# Patient Record
Sex: FEMALE | Race: WHITE
[De-identification: names, ages, dates, MRNs, and addresses within clinical notes are randomized per-mention and may not be internally consistent; named-entity substitution may affect disease eponyms.]

---

## 2017-09-24 ENCOUNTER — HEALTH MAINTENANCE LETTER (OUTPATIENT)
Age: 22
End: 2017-09-24

## 2017-11-06 ENCOUNTER — HOSPITAL ENCOUNTER (OUTPATIENT)
Dept: BEHAVIORAL HEALTH | Facility: CLINIC | Age: 22
Discharge: HOME | End: 2017-11-06
Attending: SOCIAL WORKER | Admitting: SOCIAL WORKER
Payer: COMMERCIAL

## 2017-11-06 PROCEDURE — H0001 ALCOHOL AND/OR DRUG ASSESS: HCPCS

## 2017-11-06 RX ORDER — NORGESTIMATE AND ETHINYL ESTRADIOL 7DAYSX3 28
1 KIT ORAL
COMMUNITY
Start: 2012-01-11

## 2017-11-06 RX ORDER — ALBUTEROL SULFATE 90 UG/1
2 AEROSOL, METERED RESPIRATORY (INHALATION)
COMMUNITY
Start: 2016-12-10

## 2017-11-06 NOTE — PROGRESS NOTES
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    What particular treatment choices and options would you like to have? IOP    Do you have a preference for a particular treatment program? [facility]    Criteria for Diagnosis     Criteria for Diagnosis  DSM-5 Criteria for Substance Use Disorder  Instructions: Determine whether the client currently meets the criteria for Substance Use Disorder using the diagnostic criteria in the DSM-V pp.481-589. Current means during the most recent 12 months outside a facility that controls access to substances    Category of Substance Severity (ICD-10 Code / DSM 5 Code)     Alcohol Use Disorder Mild  (F10.10) (305.00)   Cannabis Use Disorder NA   Hallucinogen Use Disorder NA   Inhalant Use Disorder NA   Opioid Use Disorder NA   Sedative, Hypnotic, or Anxiolytic Use Disorder NA   Stimulant Related Disorder NA   Tobacco Use Disorder NA   Other (or unknown) Substance Use Disorder NA       Collateral Contact Summary   Number of contacts made: 2    Contact with referring person:  No.    If court related records were reviewed, summarize here: NA    Information from  collateral contacts supported/largely agreed with information from the client and associated risk ratings.      Rule 25 Assessment Summary and Plan   's Recommendation    1. Abstain from using all non-prescribed mood altering chemicals and substances.  2. Attend an IOP treatment program such as [facility], [facility], or [facility].  3. Comply with all legal obligations and requirements of [facility].      Collateral Contacts     Name:    [facility] ([facility])   Relationship:    Medical Records   Phone Number:    BERNARD Releases:    Yes     See throughout this assessment (above) collateral obtained from EMR.      Collateral Contacts     Name:    Earl Ruiz   Relationship:    Father   Phone Number:    [phone]   Releases:    Yes     Left VM for collateral on 11/7/17. Collateral called back on 11/7/417 and confirmed client report. Collateral contact confirmed that client has had her second DWI, that her pattern of use and last use date do sound accurate to him. Collateral contact confirmed client does not have medical conditions, or MH diagnosis. Collateral contact confirmed that him and his wife have expressed concern about the second DWI. Collateral contact confirmed that client does work at [employer] and the hours vary between full and part time, that the client lives with a friend, has a significant other, does not have children, and that most of her social connections do use but it is not an important part of their friendships. Collateral contact confirmed that client has only had two DWI's as legal issues and the first was in March 2016 and both were in [facility].     ollateral Contacts      A problematic pattern of alcohol/drug use leading to clinically significant impairment or distress, as manifested by at least two of the following, occurring within a 12-month period:    Recurrent alcohol/drug use in situations in which it is  physically hazardous.  Tolerance, as defined by either of the following: A need for markedly increased amounts of alcohol/drug to achieve intoxication or desired effect. and A markedly diminished effect with continued use of the same amount of alcohol/drug.  Withdrawal, as manifested by either of the following: The characteristic withdrawal syndrome for alcohol/drug (refer to Criteria A and B of the criteria set for alcohol/drug withdrawal).      Specify if: In early remission:  After full criteria for alcohol/drug use disorder were previously met, none of the criteria for alcohol/drug use disorder have been met for at least 3 months but for less than 12 months (with the exception that Criterion A4,  Craving or a strong desire or urge to use alcohol/drug  may be met).     In sustained remission:   After full criteria for alcohol use disorder were previously met, non of the criteria for alcohol/drug use disorder have been met at any time during a period of 12 months or longer (with the exception that Criterion A4,  Craving or strong desire or urge to use alcohol/drug  may be met).   Specify if:   This additional specifier is used if the individual is in an environment where access to alcohol is restricted.    Mild: Presence of 2-3 symptoms    Moderate: Presence of 4-5 symptoms    Severe: Presence of 6 or more symptoms

## 2017-11-06 NOTE — PROGRESS NOTES
"[facility]   [address] [place] [address] N.  [address] [place] [id number]        ADULT CD ASSESSMENT ADDENDUM      Patient Name: Nasra Ruiz  Cell Phone:   Home: [phone] (home)    Mobile:   Telephone Information:   Mobile [phone]       Email:  [email or web address]  Emergency Contact: Earl Ruiz   Tel: [phone]    ________________________________________________________________________      The patient is  Single, in a serious relationship    With which race do you identify? White    Family History   Mother   Living Father   Living   No Step-mother   NA No Step-father   NA   Maternal Grandmother   Living Fraternal  Grandmother Living   Maternal Grandfather    Living Fraternal   Grandfather    No Sister(s)   NA 1 Brother(s)   Living   No Half-sister(s)   NA No Half-brother(s)   NA             Who raised you? (parents, grandparents, adoptive parents, step-parents, etc.)    Both Parents    Have any of your family members or significant others had problems with mental illness or substance abuse?  Please explain.    \"my dad had an issue with alcoholism\"    Do you have any children or Stepchildren? No    Are you being investigated by Child Protection Services? No    Do you have a child protection worker, probation office or ? Yes, please explain: \"I am on probation in [place]\"    How would you describe your current finances?  Doing okay    If you are having problems, (unpaid bills, bankruptcy, IRS problems) please explain:  No    If working or a student are you able to function appropriately in that setting? Yes    Describe your preferred learning style:  by reading    What personal strengths do you have that can help you get sober?  \"I am very stubborn and persistent and can do what I set my mind to\".    Do you currently self-administer your medications?  N/A    Have you ever had to lie to people important to you about how much you patel?     No   Have you ever felt the " need to bet more and more money?     No   Have you ever attempted treatment for a gambling problem?     No   Have you ever touched or fondled someone else inappropriately or forced them to have sex with you against their will?     No   Are you or have you ever been a registered sex offender?     No   Is there any history of sexual abuse in your family?     No   Have you ever felt obsessed by your sexual behavior, such as having sex with many partners, masturbating often, using pornography often?     No   Have you ever received therapy or stayed in the hospital for mental health problems?     No   Have you ever hurt yourself, such as cutting, burning or hitting yourself?     No   Have you ever purged, binged or restricted yourself as a way to control your weight?     No   Are you on a special diet?     No   Do you have any concerns regarding your nutritional status?     No   Have you had any appetite changes in the last 3 months?     No   Have you had any weight loss or weight gain in the last 3 months?    If weight patient gained or lost was more than 10 lbs, then refer to program RN / attending Physician for assessment.     No   Was the patient informed of BMI?    Declined to provide weight.     No   Do you have any dental problems?     No   Have you ever lived through any trauma or stressful life events?     No   In the past month, have you had any of the following symptoms related to the trauma listed above? (dreams, intense memories, flashbacks, physical reactions, etc.)     No   Have you ever believed people were spying on you, or that someone was plotting against you or trying to hurt you?     No   Have you ever believed someone was reading your mind or could hear your thoughts or that you could actually read someone's mind or hear what another person was thinking?     No   Have you ever believed that someone of some force outside of yourself was putting thoughts into your mind or made you act in a way that  was not your usual self?  Have you ever though you were possessed?     No   Have you ever believed you were being sent special messages through the TV, radio or newspaper?     No   Have you ever heard things other people couldn't hear, such as voices or other noises?     No   Have you ever had visions when you were awake?  Or have you ever seen things other people couldn't see?     No       Suicide Screening Questions:   1. Are you feeling hopeless about the present/future?     No   2. Have you ever had thoughts about taking your life?     No   3. When did you have these thoughts?     NA   4. Do you have any current intent or active desire to take your life?     No   5. Do you have a plan to take your life?     No   6. Have you ever made a suicide attempt?     No   7. Do you have access to pills, guns or other methods to kill yourself?     No     Guide to Risk Ratings   IDEATION: Active thoughts of suicide? INTENT: Intent to follow on suicide? PLAN: Plan to follow through on suicide? Level of Risk:   IF Yes Yes Yes Patient = High Emergent   IF Yes Yes No Patient = High Urgent/Non-Emergent   IF Yes No No Patient = Moderate Non-Urgent   IF No No   No Patient = Low Risk   The patient's ADDITIONAL RISK FACTORS and lack of PROTECTIVE FACTORS may increase their overall suicide risk ratings.     Patient's Responses (within the last 30 days)   IDEATION: Active thoughts of suicide?    No     INTENT: Intent to follow on suicide?    No     PLAN: Plan to follow through on suicide?    No     Determining the level of risk depends on the patient responses, suicide risk factors and protective factors.     Additional Risk Factors: Significant legal problems including being at risk of incarceration   Protective Factors:  Having people in his/her life that would prevent the patient from considering committing suicide (i.e. young children, spouse, parents, etc.)  An absence of mental health issues or stable and well treated mental  "health issues  An absence of chronic health problems or stable and well treated chronic health issues  Having restricted access to highly lethal means of suicide     Risk Status   Emergent? No   Urgent / Non-Emergent? No   Present / Non- Urgent? No    Low Risk? Yes, Evaluation Counselors - Document in Epic / SBAR to counselor \"No identified risk\" and Treatment Counselors - Assess weekly in progress notes under Dimension 3 and summarize in Discharge / Treatment summary under Dimension 3.   Additional information to support suicide risk rating: See Above       Mental Health Status   Physical Appearance/Attire: Appears stated age and Neat   Hygiene: well groomed   Eye Contact: at examiner   Speech Rate:  regular   Speech Volume: regular   Speech Quality: fluid   Cognitive/Perceptual:  reality based   Cognition: memory intact    Judgment: able to concentrate   Insight: able to concentrate   Orientation:  time, place, person and situation   Thought::   concrete   Hallucinations:  none   General Behavioral Tone: cooperative   Psychomotor Activity: no problem noted   Gait:  no problem   Mood: normal   Affect: congruence/appropriate   Counselor Notes: NA       Criteria for Diagnosis: DSM-5 Criteria for Substance Use Disorders      Alcohol Use Disorder Mild - 305.00 (F10.10)       Level of Care   I.) Intoxication and Withdrawal: 0   II.) Biomedical:  0   III.) Emotional and Behavioral:  1   IV.) Readiness to Change:  1   V.) Relapse Potential: 2   VI.) Recovery Environmental: 2       Initial Problem List     The patient lacks relapse prevention skills  The patient has poor coping skills  The patient has poor refusal skills   The patient lacks a sober peer support network  The patient has current legal issues    Patient/Client is willing to follow treatment recommendations.  Yes    Counselor: SONJA Greer      Vulnerable Adult Checklist for OUTPATIENTS     1.  Do you have a physical, emotional or mental infirmity or " dysfunction?       No    2.  Does this issue impair your ability to provide for your own care without help, including providing yourself with food, shelter, clothing, healthcare or supervision?       No    3.  Because of this issue, I need assistance to protect myself from maltreatment by others.      No    Based on the above information:    This person is not a functional Vulnerable Adult according to [place] Statute 626.5572 subdivision 21.

## 2017-11-07 ASSESSMENT — ANXIETY QUESTIONNAIRES
GAD7 TOTAL SCORE: 1
5. BEING SO RESTLESS THAT IT IS HARD TO SIT STILL: NOT AT ALL
3. WORRYING TOO MUCH ABOUT DIFFERENT THINGS: NOT AT ALL
1. FEELING NERVOUS, ANXIOUS, OR ON EDGE: SEVERAL DAYS
IF YOU CHECKED OFF ANY PROBLEMS ON THIS QUESTIONNAIRE, HOW DIFFICULT HAVE THESE PROBLEMS MADE IT FOR YOU TO DO YOUR WORK, TAKE CARE OF THINGS AT HOME, OR GET ALONG WITH OTHER PEOPLE: NOT DIFFICULT AT ALL
6. BECOMING EASILY ANNOYED OR IRRITABLE: NOT AT ALL
7. FEELING AFRAID AS IF SOMETHING AWFUL MIGHT HAPPEN: NOT AT ALL
2. NOT BEING ABLE TO STOP OR CONTROL WORRYING: NOT AT ALL

## 2017-11-07 ASSESSMENT — PATIENT HEALTH QUESTIONNAIRE - PHQ9
SUM OF ALL RESPONSES TO PHQ QUESTIONS 1-9: 0
5. POOR APPETITE OR OVEREATING: NOT AT ALL

## 2017-11-08 ASSESSMENT — ANXIETY QUESTIONNAIRES: GAD7 TOTAL SCORE: 1

## 2017-11-08 NOTE — PROGRESS NOTES
"CHEMICAL DEPENDENCY ASSESSMENT DICTATION     EVALUATION COUNSELOR:  SONJA Greer.   PATIENT'S ADDRESS:  [address] [place] [address], [address], [place] [address].   TELEPHONE:  [phone].   STATISTICS:  YOB: 1995.  Age:  22.  Gender:  Female.  Marital Status:  Single.   DATE OF EVALUATION:  11/06/2017.   INSURANCE:  [employer].   REFERRAL SOURCE:  .      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  Got an assessment at [facility] and [facility] end of 08/2017 and then dad recommended coming to [place], had  for my 1st DWI as well.\"      HEALTH HISTORY AND MEDICATIONS:  Client denies any medical conditions or concerns.  Client reported birth control as medication which is Ortho Tri Cyclen.      HISTORY OF PREVIOUS TREATMENT AND COUNSELING:  Client denies a past treatment.  Client denied any counseling, current or past.      HISTORY OF ALCOHOL AND DRUG USE:  Alcohol:  Age of first use 16.  Age 22, weekends sometimes after work, not every weekend, 3-5 drinks, and consuming alcohol.  Date of last use:  09/21/2017.      SUMMARY OF CHEMICAL DEPENDENCY SYMPTOMS ACKNOWLEDGED BY THE PATIENT:  The patient is meeting criteria for alcohol use disorder, mild, which are:  Recurrent alcohol/drug use in situations in which it is physically hazardous; tolerance as defined by a need for markedly increased amounts of alcohol/drug to achieve intoxication or desired effect; a markedly diminished effect with the continued use of the same amount of alcohol/drug; withdrawal as manifested by the characteristic withdrawal syndrome for alcohol/drug.      SUMMARY OF COLLATERAL DATA:  The [place] electronic medical record, EMR.  See throughout CD assessment.  Collateral obtained from " [facility] and also her father.  Left voicemail for collateral on 11/07/2017. Collateral called back on 11/7/417 and confirmed client report. Collateral contact confirmed that client has had her second DWI, that her pattern of use and last use date do sound accurate to him. Collateral contact confirmed client does not have medical conditions, or MH diagnosis. Collateral contact confirmed that him and his wife have expressed concern about the second DWI. Collateral contact confirmed that client does work at [email or web address] and the hours vary between full and part time, that the client lives with a friend, has a significant other, does not have children, and that most of her social connections do use but it is not an important part of their friendships. Collateral contact confirmed that client has only had two DWI's as legal issues and the first was in March 2016 and both were in [facility].      MENTAL STATUS ASSESSMENT:  Physical appearance and attire:  Appears stated age and neat.  Hygiene:  Well groomed.  Eye contact:  At examiner.  Speech regular.  Speech volume regular.  Speech quality fluid.  Cognitive perceptual:  Reality based.  Cognition:  Memory intact.  Judgment:  Able to concentrate.  Insight:  Able to concentrate.  Orientation to time, place, person and situation.  Thought:  Cary.  Hallucinations:  None.  General behavioral tone:  Cooperative.  Psychomotor activity:  No problem noted.  Gait:  No problem.  Mood normal.  Affect:  Congruent and appropriate.      VULNERABLE ADULT ASSESSMENT:  This person is not a functional vulnerable adult according to [place] statute.      DSM IMPRESSION/DIAGNOSIS:  F10.10.      [facility] PLACEMENT CRITERIA:   DIMENSION 1:  Intoxication/Withdrawal:  Risk rating 0.  The client reported a 2nd DWI as the reason for the assessment.  Client reported last use date of alcohol was 09/21/2017.  Client reported withdrawal symptoms in the past 12 months, but denied any in the last 30  days.  Client denied past admission to detox.      DIMENSION 2:  Biomedical Conditions and Complications:  Risk rating 0.  The client denied any current medical conditions or concerns.  Client reported having a primary care clinic.  Client reported she takes her medications as prescribed directly.  Client reported 1 past accident/injury related to use.      DIMENSION 3:  Emotional/Behavioral/Cognitive:  Risk rating 1.  Client denies past or current mental health diagnosis or symptoms.  Client denied current or past prescribed medications for mental health symptoms.  Client denied past or current therapy.  Client denied past or current abuse of any type.  Client reported supportive childhood.  Client reported her dad has alcoholism.  Client denied past or current SI.  Client denied past suicide attempts.  Client's risk factor of SI are legal issues.  Client protective factors of SI are having people worth living for, stable physical/mental health, and no means.      DIMENSION 4:  Readiness to Change:  Risk rating 1.  Client reported the problem was she did not stop drinking and driving.  Client reported her parents are now concerned with her use since she obtained a 2nd DWI.  Client reported that their concern is valid.  Client reported she is willing to not drink anymore since it creates more problems.  Client appears to be in the contemplative stage of change as evidenced by her verbal report.      DIMENSION 5:  Relapse, Continued Use, & Continued Problem Potential:  Risk rating 2.  Client reported what helps her abstain from use is an application on her phone that tracks her days and sends inspirational quotes to her.  Client reported nothing specific as back to use.  Client appears to lack insight into her use and education on addiction.  Client denied cravings, but reported triggers of going out with people that drink or are drinking.  Client denied past treatment attendance or sober support group meeting  attendance.  Client appears to lack positive coping skills/relapse prevention skills.      DIMENSION 6:  Recovery Environment:  Risk rating 2.  Client reported her hours of work varies from part-time to full-time.  Client reported using is not important to her social connections, but social connections do use.  Client reported she is in a romantic relationship and her partner does not really use.  Client denied having children.  Client reported she lives with a friend who uses occasionally.  Client reported her family, boyfriend and close friends are supportive of her.  Client reported current legal issues and is on probation.      RECOMMENDATIONS:  Client is recommended to:   1.  Abstain from using all non-prescribed mood-altering chemicals and substances.   2.  Attend an [facility] treatment program such as [facility], [facility] or [facility].   3.  Comply with all legal obligations and requirements of [facility].      INITIAL PROBLEM LIST:  The patient lacks relapse prevention skills.  The patient has poor coping skills.  The patient has poor refusal skills.  The patient lacks a sober peer support network.  The patient has current legal issues.      RATIONALE:  Client reported her use has negatively impacted multiple areas of her life and meets criteria for a substance use disorder occurring within a 12-month period.  Client would appear to benefit from a structured sober support and routine, learning more positive coping skills/relapse prevention skills, and also education on addiction to gain more awareness and insight into her substance use and the negative consequences of her use.         This information has been disclosed to you from records protected by Federal confidentiality rules (42 CFR part 2). The Federal rules prohibit you from making any further disclosure of this information unless further disclosure is expressly permitted by the written consent of the  person to whom it pertains or as otherwise permitted by 42 CFR part 2. A general authorization for the release of medical or other information is NOT sufficient for this purpose. The Federal rules restrict any use of the information to criminally investigate or prosecute any alcohol or drug abuse patient.      BIRGIT HOUSTON [facility]             D: 2017 13:56   T: 2017 04:12   MT: lg      Name:     JOHN PLUNKETT   MRN:      [id number]        Account:      [id number]   :      1995           Visit Date:   2017      Document: [id number]

## 2017-11-22 ENCOUNTER — HOSPITAL ENCOUNTER (OUTPATIENT)
Dept: BEHAVIORAL HEALTH | Facility: CLINIC | Age: 22
End: 2017-11-22
Attending: SOCIAL WORKER
Payer: COMMERCIAL

## 2017-11-22 PROBLEM — F19.20 CHEMICAL DEPENDENCY (H): Status: ACTIVE | Noted: 2017-11-22

## 2017-11-22 PROCEDURE — H2035 A/D TX PROGRAM, PER HOUR: HCPCS | Mod: HQ

## 2017-11-23 NOTE — PROGRESS NOTES
D- Client attended orientation this date. Client was given an orientation packet which was reviewed with them in its entirety. The following was specifically reviewed with the client: Program philosophy, treatment goals, program schedules, education components, the family program, using treatment materials, program rules, client rights/responsibilities, information on HIV, STDS, Hepatitis, TB, information on use while pregnant, opioid information and Narcan information, program abuse prevention plan/reporting protocol, client grievance procedure, risks of treatment, confidentiality, treatment agreement, facility tour/safety information and information on co-occurring disorders. Client was also given information on insurance and was given the business office contact information should they have any questions. Client reported no previous treatment.  She a goal of surrounding better ways to deal with stress.    I- Signing of paperwork, tour of facility, provided client with counselor's name and phone number. Introduced to group processes by sharing and feedback, educated on stage of change.    A- Client appears motivated.    P- Client will review orientation paperwork and materials.  Client will review her handbook and primary counselor, Marely Wilkerson, will contact client to arrange start date for primary group.

## 2017-11-23 NOTE — PROGRESS NOTES
Initial Services Plan        Before your first treatment group, please do the following    Immediate health & safety concerns: Look for a support network (such as AA, NA, DBT group, a Bahai group, etc.)    Suggestions for client during the time between intake & completion of treatment plan:  Review your patient or client handbook.    Client issues to be addressed in the first treatment sessions:  Identify motivations(s) for coming to treatment, i.e. legal, family, job, self      SONJA Lynch  11/22/2017  6:26 PM

## 2017-12-06 ENCOUNTER — HOSPITAL ENCOUNTER (OUTPATIENT)
Dept: BEHAVIORAL HEALTH | Facility: CLINIC | Age: 22
End: 2017-12-06
Attending: SOCIAL WORKER
Payer: COMMERCIAL

## 2017-12-06 PROCEDURE — H2035 A/D TX PROGRAM, PER HOUR: HCPCS | Mod: HQ

## 2017-12-07 NOTE — PROGRESS NOTES
CD ADULT Progress Note     Treatment Plan Review completed on:  To be completed on 12/12/17    Attendance Dates: 12/6/17    Total # of Group Sessions:  Orientation on 11/22/17 + 1     MONDAY TUESDAY WEDNESDAY THURSDAY FRIDAY SATURDAY SUNDAY Total   Group Therapy   2 hrs     2 hrs   Specialty Groups*           1:1           Family Program           [place]             Phase II             Absent           Total   2 hrs      2 hrs     *Specialty Groups include Mental Health Care, Assertiveness and Communication, Sobriety Maintenance Skills, Spiritual Care, Stress Management, Relapse Prevention, Family Systems.                    Learning Style:  Visual    Staff member contributing:  Marely Wilkerson, MS, LADC, LPCC    Received supervision:  No    Client:  contributed to goals and plan    Did Client receive a copy of treatment plan/revised plan:  No (Client is scheduled to review her treatment plan and sign the acknowledgement note on 12/12/17.)    Changes to Treatment Plan:  No    Client agrees with plan/revised plan:  TBD    Any changes in Vulnerable Adult Status:  No    Substance Use Disorders:  None and Alcohol Use Disorder Mild (F10.10)      ASA Risk Ratings and Data       DIMENSION 1: Acute Intoxication/Withdrawal  The client's ability to cope with withdrawal symptoms and current state of intoxication       Acute Intoxication/Withdrawal - Current Risk Factor:  0    Reporting sober date of 9/21/17    Goals:  TBD    Data:  Client did not demonstrate or report any signs/symptoms of intoxication or withdrawal.        DIMENSION 2:  Biomedical Conditions and Complaints  The degree to which any physical disorder would interfere with treatment for substance abuse and the client's ability to tolerate any related discomfort     Biomedical Conditions and Complaints - Current Risk Factor:  0    Goals: TBD    Data:  Client denied any current physical health concerns.  Client does report having a PCP.          DIMENSION 3:   Emotional/Behavioral/Cognitive Conditions and Complications  The degree to which any condition or complications are likely to interfere with treatment for substance abuse or with function in significant life areas and the likelihood of risk of harm to self or others.     Emotional/Behavioral - Current Risk Factor:  1    DSM-5 Diagnoses:   Therapist will do a diagnostic interview to update information Client reports no past or current mental health concerns.      Suicide Assessment:  Risk Status    Ideation - Active thoughts of suicide Intent to follow through on suicide Plan for completing suicide    Yes No Yes No Yes No   Emergent  X  X  X   Urgent / Non-Emergent  X  X  X   Non- Urgent  X  X  X   No Current/Active Risk   X  X  X     Goals: TBD     Data:  Client denied any past or current SI and SIB.  She did report experiencing some sleeplessness on her weekly check-in sheet and that she is going to try an earlier bedtime.  She also noted that she is going to the gym and engaging in [place] shopping as sober activities.      Client has the following protective factors: supportive family and boyfriend, employed, has her license in [employer]. Client has the following risk factors: legal issues, and reported that she is socially influenced when it comes to using alcohol, and has used it in the past to enjoy herself.       DIMENSION 4:  Readiness to Change  Consider the amount of support and encouragement necessary to keep the client involved in treatment.     Readiness to Change - Current Risk Factor:  0    Goals:  TBD    Data:  Client rated herself at a 9 (scale of 1-10, with 10 being the highest) for motivation for sobriety.  She willingly participated in small group therapy on 12/6/17 and shared that she is in treatment due to her DWIs.         DIMENSION 5:  Relapse/Continued Use/Continued Problem Potential  Consider the degree to which the client recognizes relapse issues and has the skills to prevent relapse  "of either substance use or mental health problems.     Relapse/Continued Use/Continued Problem Potential - Current Risk Factor:  2    Goals:  TBD    Data:  Client noted on her check-in sheet that \"keeping busy\" helps minimize her cravings.  She also noted that she was triggered last week when an old friend reached out to her and that her dad is part of her sober support network.        DIMENSION 6:  Recovery Environment  Consider the degree to which key areas of the client's life are supportive of or antagonistic to treatment participation and recovery.     Recovery Environment - Current Risk Factor:  2    Support group attended this week:  No    Did family agree to attend family week:  No    If yes:  none schedule this week    Goals:  TBD    Data:  Client reported no sober support group attendance this past week.  She also denied having a sponsor.  She did note that she engaged in sober activities such as going to the gym and [place] shopping.  She also noted that she wants to \"get a new puzzle\".  She noted that her last court date is approaching and she noted \"I'm getting anxious for that\".  She noted that her roommate is supportive of her and does not keep alcohol in their apartment.         D: Deep breathing exercise     Intervention:  Client was asked when it would be beneficial to engage in deep breathing.      Assessment:  Stages of Change Model  Contemplation   Client attended group and was an active participant.  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        Plan:  Client might consider engaging in some deep breathing or body relaxation exercises before going to bed, as she has had some difficulty sleeping lately.    "

## 2017-12-11 ENCOUNTER — HOSPITAL ENCOUNTER (OUTPATIENT)
Dept: BEHAVIORAL HEALTH | Facility: CLINIC | Age: 22
End: 2017-12-11
Attending: SOCIAL WORKER
Payer: COMMERCIAL

## 2017-12-11 ENCOUNTER — BEH TREATMENT PLAN (OUTPATIENT)
Dept: BEHAVIORAL HEALTH | Facility: CLINIC | Age: 22
End: 2017-12-11

## 2017-12-11 PROCEDURE — H2035 A/D TX PROGRAM, PER HOUR: HCPCS | Mod: HQ

## 2017-12-11 NOTE — PROGRESS NOTES
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   [facility]  Adult Chemical Dependency Program  Treatment Plan Requirements    These services are provided by the facility for each patient/client according to the individual's treatment plan:    Individual and group counseling    Education    Transition services    Services to address any co-occurring mental illness    Service coordination    Initial Treatment Plan Goals:  1. Complete all the requirements of Program Orientation.  2. Maintain medication compliance throughout the program.  3. Complete requirements for workshop/skills groups based on identified issues on your problem list.  4. Complete the support group attendance feedback sheet weekly.  5. Gain family involvement in treatment process to address family issues from the problem list.  6. Attend and participate in all required groups per individual treatment plan.  7. Focus attention to individualized issues from the treatment plan.  8. Complete all requirements for UA's, alcohol screening tests and other testing.  9. Schedule a physical examination if recommended.    In addition to the above, complete all individual goals as specifically outlines on your treatment plan.    Criteria for discharge:  Patients/clients are discharged from the program following completion of the entire program including Phase I and II or acceptance of other post-treatment referrals such as [facility], or aftercare at other facilities.  Patients/clients may also be discharged for inappropriate behavior or chemical use.      Favorable Discharge - Patients/clients have completed agreed upon treatment goals, understand their diagnosis and appear motivated about the follow-up care.    Guarded Discharge - Patients/clients have demonstrated some  understanding of their diagnosis and recovery process, and have completed some of their treatment goals.  This prognosis also includes patients/clients who have completed some treatment goals but have not made commitment to community support or follow through with referrals.    Unfavorable Discharge - Patients/clients have not completed agreed upon treatment goals due to their own choice, have limited understanding of their diagnosis, and have shown minimal or inconsistent behavior conducive to recovery.  Those patients/clients discharged due to behavioral problems will also be unfavorable discharges.                                  Adult CD Treatment Plan     Nasra Ruiz [id number]   1995 22 year old female      Substance Use Disorders: Alcohol   ------------------------------------------------------------------------------------------------------------------  Acute Intoxication/Withdrawal Potential     DIMENSION 1  RISK FACTOR: 0             AssignmentDate Source Area of Treatment Focus/Goal/  Treatment Strategies Target  Date Initials Outcome Completion  Date   12/11/17 Self -  Current  Area of Treatment Focus: Client's last use date was 9/21/17.     Goal: Client will develop effective strategies to maintain abstinence.     Treatment Strategies:  1. Client will report to staff and group any alcohol or drug use.               5/1/18                          Biomedical Conditions and Complaints     DIMENSION 2  RISK FACTOR: 0             AssignmentDate Source Area of Treatment Focus/Goal/  Treatment Strategies Target  Date Initials Outcome Completion  Date   12/11/17 Self -  Deferred  Area of Treatment Focus: Client denied any current physical health concerns.     Goal: Client will continue to effectively manage her overall physical health.    Treatment Strategies:   1. Client will continue to follow-up with her PCP, as needed.               5/1/18                   "      -----------------------------------------------------------------------------------------------------------------    Emotional/Behavioral/Cognitive Conditions and Complications     DIMENSION 3  RISK FACTOR: 1             AssignmentDate Source Area of Treatment Focus/Goal/  Treatment Strategies Target  Date Initials Outcome Completion  Date   12/11/17 Self -  Current  Area of Treatment Focus: Client denied any current or past mental health concerns/issues. Client reported that she wants to change her focus regarding her career and wants to better herself.    Goal: Client will gain insight regarding her interest in various career fields and gain awareness regarding her identity to focus on what in particular she wants to improve in herself.     Treatment Strategies:  1. Client will complete the autobiography assignment and share her experience with the group.     2. Client will create an identity map and determine various roles she currently hold in her life.     3. Client will identify 5 strengths she holds and explain how she can use these strengths to better herself and help her achieve personal goals.     4. Client will complete the \"self-exploration: identity, values, experiences, and goals\" worksheet.     5. Client will complete a \"career clusters\" worksheet and share her experience with the group.                           12/20/17        1/3/18        1/3/18          1/8/18        1/24/18                           APA/SS        APA/SS        APA/SS          APA/SS        APA/SS                           Effective         Effective        Effective          Effective        Effective                           1/17/18        1/3/18        3/13/18          4/3/18        1/24/18     -------------------------------------------------------------------------------------------------------------------     Readiness to Change     DIMENSION 4  RISK FACTOR: 0             AssignmentDate Source Area of Treatment " Focus/Goal/  Treatment Strategies Target  Date Initials Outcome Completion  Date   12/11/17 Self -  Current  Area of Treatment Focus: Client has struggled to maintain long-term sobriety and is need of an outpatient treatment program.    Goal: Client will verbalize the benefits of this outpatient treatment program.    Treatment Strategies:  1. Client will attend three, two hour sessions of group a week and individual sessions as needed throughout the course of Phase I treatment.    2. Client will attend a 1.5 hour session of group a week, and individual sessions as needed throughout the course of Phase II treatment.     Area of Treatment Focus: Client verbalized changes she wants to make regarding her finances and to improve her overall lifestyle.     Goal: Client will gain insight regarding her motivation towards the changes she wants to achieve, and develop realistic goals to achieve the financial changes she wants to make in her life.     Treatment Strategies:  1. Client will complete a motivation and change assignment.     2. Client will develop a SMART goal regarding her finances and money she wants to save, and share her goal and progress in group therapy. Client will be given a financial SMART goal to use as a guide.                  2/5/18 5/1/18 12/18/17 12/27/17                 APA/SS          SS                              APA/SS      APA/SS                 Effective                                        Effective      Client reported on 2/6/18 (1:1 session) that she did not need to complete this anymore as she has already been saving money on her own.                  2/7/18 12/18/17 2/6/18     -------------------------------------------------------------------------------------------------------------------     Relapse/Continues Use/Continues Problem Potential     DIMENSION 5  RISK FACTOR: 2                "  AssignmentDate Source Area of Treatment Focus/Goal/  Treatment Strategies  Target  Date Initials Outcome Completion  Date   12/11/17 Self -  Current  Area of Treatment Focus: Client lacks insight to relapse prevention skills and reports that she drinks in social situations and is socially influenced by others when it comes to using alcohol.     Goal: Client will gain insight regarding relapse prevention skills and will demonstrate these skills and how she can apply them in social situations.     Treatment Strategies:  1.Client will complete the \"pros and cons of quitting\" and determine the positive and negative immediate and long-term consequences of using alcohol to gain awareness regarding her use.     2. Client will complete the \"social pressures\" worksheet to determine social pressures that lead to the use of alcohol. Client will determine 5 social pressure situations, their level of threat and a coping skill she can use when faced with this situation.     3. Client will complete the \"relapse warning signs\" worksheet and identify 5 relapse warning signs and coping skills.     4. Client will complete the recovery care plan while in Phase II.                         1/10/18            1/15/18                1/15/18        4/17/18                                     APA/SS            APA/SS                APA/SS        SS                                     Effective                Effective                                     1/22/18 1/22/18     Recovery Environment     DIMENSION 6  RISK FACTOR: 2                 AssignmentDate Source Area of Treatment Focus/Goal/  Treatment Strategies Target  Date Initials Outcome Completion  Date   12/11/17 Self -  Current  Area of Treatment Focus: Client reported that she is currently on probation due to a DWI she received in August 2017 and lacks a sober support network.    Goal: Client will follow all directives from her , once she acquires a " "specific  and will expand her sober support friend network.     Treatment Strategies:  1. Client will verbalize her specific probation requirements in group therapy. If needed, request feedback from peers.     2. Client will attend a sober support meeting and share experience with the group.                  3. Client will attain numbers of at least one individual from a sober support meeting she attends or identify one sober friend she can contact.     4. Client will contact this individual and schedule a social gathering, such as; coffee.                        5/1/18 1/17/18 1/17/18 1/22/18                             SS        APA/SS                    APA/SS          APA/SS                                Client reported that she does not want to attend a sober support meeting    Effective          Effective                                                   2/7/18 2/20/18     All interventions that are designated as \"current\" will need to be completed in order to transition out of treatment with a favorable prognosis. The treatment plan is a flexible document and a work in progress. Interventions and goals may be added at any time to customize the treatment plan to each individual's needs. Client may work with therapist/counselor to change interventions as long as they pertain to the goals stipulated in the plan and/or are clinically driven.     Individual abuse prevention plan (required for lodging plus) : specific actions, referral:   No additional protection measures required other than the Program Abuse Prevention Plan - NO      "

## 2017-12-11 NOTE — PROGRESS NOTES
Acknowledgement of Current Treatment Plan       I have reviewed my treatment plan with my therapist / counselor on 12/12/17, and I have received a copy of my treatment plan on this date. I agree with the plan as it is written in the electronic health record.    Client is completing her safety plan. Client reports her last date of use on 9/21/17.    Name Signature   Nasra Ruiz    Name of Therapist / Counselor    Ruperto Reeves, Intern    Marely Wilkerson, MS, LADC, LPCC,  Supervising Therapist

## 2017-12-12 ENCOUNTER — HOSPITAL ENCOUNTER (OUTPATIENT)
Dept: BEHAVIORAL HEALTH | Facility: CLINIC | Age: 22
End: 2017-12-12
Attending: SOCIAL WORKER
Payer: COMMERCIAL

## 2017-12-12 PROCEDURE — H2035 A/D TX PROGRAM, PER HOUR: HCPCS

## 2017-12-12 PROCEDURE — H2035 A/D TX PROGRAM, PER HOUR: HCPCS | Mod: HQ

## 2017-12-12 NOTE — PROGRESS NOTES
CD ADULT Progress Note     Treatment Plan Review completed on: 12/12/17; Client turned her safety plan in on 12/13/17 (later than expected) as she was unsure how to answer some of the questions.  Therefore, client's Acknowledgement of Current Treatment Plan was signed by client and counseling intern on 12/13/17.      Attendance Dates: 12/11/17, 12/12/17, 12/13/17    Total # of Group Sessions:  Orientation on 11/22/17 + 4     MONDAY TUESDAY WEDNESDAY THURSDAY FRIDAY SATURDAY SUNDAY Total   Group Therapy 2hrs 2hrs 2hrs     6hrs   Specialty Groups*           1:1           Family Program           [place]             Phase II             Absent           Total 2hrs 2hrs 2hrs     6hrs     *Specialty Groups include Mental Health Care, Assertiveness and Communication, Sobriety Maintenance Skills, Spiritual Care, Stress Management, Relapse Prevention, Family Systems.                    Learning Style:  Visual    Staff member contributing:  Marely Wilkerson, MS, LADC, LPCC    Received supervision:  No    Client:  contributed to goals and plan    Did Client receive a copy of treatment plan/revised plan:  Yes    Changes to Treatment Plan:  No    Client agrees with plan/revised plan:  Yes    Any changes in Vulnerable Adult Status:  No    Substance Use Disorders:  None and Alcohol Use Disorder Mild (F10.10)      ASAM Risk Ratings and Data       DIMENSION 1: Acute Intoxication/Withdrawal  The client's ability to cope with withdrawal symptoms and current state of intoxication       Acute Intoxication/Withdrawal - Current Risk Factor:  0    Reporting sober date of 9/21/17    Goals:  Client will develop effective strategies to maintain abstinence.    Data:  Client did not demonstrate or report any signs/symptoms of intoxication or withdrawal.        DIMENSION 2:  Biomedical Conditions and Complaints  The degree to which any physical disorder would interfere with treatment for substance abuse and the client's ability to tolerate any  "related discomfort     Biomedical Conditions and Complaints - Current Risk Factor:  0    Goals: Client will continue to effectively manage her overall physical health    Data:  Client denied any current physical health concerns.  Client does report having a PCP.          DIMENSION 3:  Emotional/Behavioral/Cognitive Conditions and Complications  The degree to which any condition or complications are likely to interfere with treatment for substance abuse or with function in significant life areas and the likelihood of risk of harm to self or others.     Emotional/Behavioral - Current Risk Factor:  1    DSM-5 Diagnoses:   Therapist will do a diagnostic interview to update information which is scheduled for 12/27/17. Client reports no past or current mental health concerns.      Suicide Assessment:  Risk Status    Ideation - Active thoughts of suicide Intent to follow through on suicide Plan for completing suicide    Yes No Yes No Yes No   Emergent  X  X  X   Urgent / Non-Emergent  X  X  X   Non- Urgent  X  X  X   No Current/Active Risk   X  X  X     Goals: Client will gain insight regarding her interest in various career fields and gain awareness regarding her identity to focus on what in particular she wants to improve in herself.     Data:  Client denied any past or current SI and SIB.      Client reported that her boyfriend came to visit her this past week, however, client mentioned that she is \"sad\" he is leaving again.     Client stated that she had to close at work this past weekend, and has been feeling tired due to waking up early in the morning to come to group, however, she mentioned that she is proud of herself for doing this and being in treatment.     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 Client has the following risk factors: legal issues, and reported that she is socially influenced when it comes to using alcohol, and has " "used it in the past to enjoy herself.       DIMENSION 4:  Readiness to Change  Consider the amount of support and encouragement necessary to keep the client involved in treatment.     Readiness to Change - Current Risk Factor:  0    Goals:  Client will verbalize the benefits of this outpatient treatment program.               Client will gain insight regarding her motivation towards the changes she wants to achieve, and develop               realistic goals to achieve the financial changes she wants to make in her life.     Data: Client reported on her check-in sheet from 12/11/17, that her main motivation for sobriety is her happiness. Client also mentioned that she has been able to start running more at the gym, and wants to continue to improve her running.     Client reported that this weekend, she will be working, however, she will be going out to lunch with her parents for self-care. Client also mentioned she will be aleksandar shopping this weekend.        DIMENSION 5:  Relapse/Continued Use/Continued Problem Potential  Consider the degree to which the client recognizes relapse issues and has the skills to prevent relapse of either substance use or mental health problems.     Relapse/Continued Use/Continued Problem Potential - Current Risk Factor:  2    Goals:  Client will gain insight regarding relapse prevention skills and will demonstrate these skills and how she can apply them in social situation.    Data:  Client reported on her check-in sheet from 12/11/17 that her biggest trigger this past week was \"going out to eat and not ordering a drink.\" Client also reported that avoiding situations where there is alcohol, help her minimize her cravings. During the unexpected situation activity in group on 12/12/17, client was able to describe what her reaction would be in a particular situation and what she would do in that scenario, which was to order a non-alcoholic drink in substitute of ordering a drink that " "contains alcohol.     Client also mentioned that during group therapy, she has been able to gain awareness regarding how alcohol can impact her life.      DIMENSION 6:  Recovery Environment  Consider the degree to which key areas of the client's life are supportive of or antagonistic to treatment participation and recovery.     Recovery Environment - Current Risk Factor:  2    Support group attended this week:  No    Did family agree to attend family week:  No    If yes:  none schedule this week    Goals:  Client will follow all directives from her , once she acquires a specific  and will expand her sober support network.     Data:  Client reported no sober support group attendance this past week. Client reported on her check-in sheet from 12/11/17, that attending an AA meeting is not \"necessary\" for her. Client reported that her father is supportive of her and her treatment. Client also mentioned that she has an interlock device in her car and considers this to be her \"antabuse.\"        D: Group therapy discussion    Intervention:  Gain awareness from group therapy discussions, and learn new skills she can use to avoid relapse from activities in group therapy.     Assessment:  Stages of Change Model  Contemplation   Client verbalized that she finds the mindfulness skills conducted in group therapy, such as deep breathing, are helpful for her. Client shared in group that she currently has an interlock device in her car, and verbalized that this has been helpful for her. Client also mentioned that the interlock device is her \"antabuse\" and what keeps her from drinking. It appears that client is currently relying on the interlock device to avoid using alcohol and driving. It appears that client continues to lack awareness on how her use of alcohol and driving and how this may affect herself and others.     Plan:  Client reported she will continue to use deep breathing in her daily " schedule. Client will continue to gain insight and awareness regarding her use of alcohol and identity in group therapy. Client has a motivation and change assignment due on 12/18/17 to gain more awareness regarding her motivation to change and avoid using alcohol.

## 2017-12-13 ENCOUNTER — HOSPITAL ENCOUNTER (OUTPATIENT)
Dept: BEHAVIORAL HEALTH | Facility: CLINIC | Age: 22
End: 2017-12-13
Attending: SOCIAL WORKER
Payer: COMMERCIAL

## 2017-12-13 PROCEDURE — H2035 A/D TX PROGRAM, PER HOUR: HCPCS | Mod: HQ

## 2017-12-13 NOTE — PROGRESS NOTES
Patient Safety Plan Template    Name:   Nasra Ruiz YOB: 1995 Age:  22 year old MR Number:  [id number]   Step 1: Warning signs (Thoughts, images, mood, situation, behavior) that a crisis may be developin. Being around alcohol     2. Being asked to get drinks     3. Being asked why I never go out anymore     Step 2: Internal coping strategies - Things I can do to take my mind off of my problems without contacting another person (relaxation technique, physical activity):     1. Deep breaths     2. Listen to music     3. Go to the gym     Step 3: People and social settings that provide distraction:     1. Name: Sree   Phone: [phone]   2. Name: Richar   Phone: [phone]   3. Place: My room   4. Place: Gym     The one thing that is most important to me and worth living for is: My parents   Step 4: People whom I can ask for help:     1. Name: Scott Hampton   Phone: [phone]     2. Name: Cristina Mckinley   Phone: [phone]     3. Name: Tony Briceno   Phone: [phone]     Step 5: Professionals or agencies I can contact during a crisis:     1. Clinician Name: Nicole Griffith   Phone: BERNARD   Clinician Pager or Emergency Contact #: NA     2. Clinician Name: [facility]   Phone: BERNARD     Clinician Pager or Emergency Contact #: NA     3. Local Urgent Care Services: [facility]    Urgent Care Services Address:     Urgent Care Services Phone: [phone]     4. Suicide Prevention [employer] [employer] Phone: [phone] ([phone])     Step 6: Making the environment safe:     1. No alcohol in the home     2. Avoiding Bars     Safety Plan Template 2008 Rosalinda Andres and Evgeny Bryan is reprinted with the express permission of the authors.  No portion of the Safety Plan Template may be reproduced without the express, written permission.  You can contact the authors at bhs@[place].[facility] or ohney@mail.[facility].

## 2017-12-13 NOTE — PROGRESS NOTES
Acknowledgement of Current Treatment Plan       I have reviewed my treatment plan with my therapist / counselor on 12/12/17, and I have received a copy of my treatment plan on this date. I agree with the plan as it is written in the electronic health record.    Client turned in her safety plan on 12/13/17, and received a copy of her safety plan on this date. Client reports her last date of use on 9/21/17.    Name Signature   Nasra Ruiz    Name of Therapist / Counselor    Ruperto Reeves, Intern    Marely Wilkerson, MS, LADC, LPCC,  Supervising Therapist

## 2017-12-18 ENCOUNTER — HOSPITAL ENCOUNTER (OUTPATIENT)
Dept: BEHAVIORAL HEALTH | Facility: CLINIC | Age: 22
End: 2017-12-18
Attending: SOCIAL WORKER
Payer: COMMERCIAL

## 2017-12-18 PROCEDURE — H2035 A/D TX PROGRAM, PER HOUR: HCPCS | Mod: HQ

## 2017-12-19 ENCOUNTER — HOSPITAL ENCOUNTER (OUTPATIENT)
Dept: BEHAVIORAL HEALTH | Facility: CLINIC | Age: 22
End: 2017-12-19
Attending: SOCIAL WORKER
Payer: COMMERCIAL

## 2017-12-19 PROCEDURE — H2035 A/D TX PROGRAM, PER HOUR: HCPCS | Mod: HQ

## 2017-12-19 NOTE — PROGRESS NOTES
[address] [address]        12/19/2017      Nasra Ruiz  1995        To Whom It May Concern:    This is to verify that the above named client is participating in a program for chemical dependency at [facility] at the location above.    The individual was admitted on 11/22/17.    Client is currently enrolled in treatment with a tentative discharge date of 5/1/2018.    This individual is participating in the following program(s):  the Outpatient Program          LIZBETH Sykes

## 2017-12-27 ENCOUNTER — HOSPITAL ENCOUNTER (OUTPATIENT)
Dept: BEHAVIORAL HEALTH | Facility: CLINIC | Age: 22
End: 2017-12-27
Attending: SOCIAL WORKER
Payer: COMMERCIAL

## 2017-12-27 PROCEDURE — H2035 A/D TX PROGRAM, PER HOUR: HCPCS | Mod: HQ

## 2017-12-27 PROCEDURE — H2035 A/D TX PROGRAM, PER HOUR: HCPCS
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
## 2017-12-28 NOTE — PROGRESS NOTES
CD ADULT Progress Note     Treatment Plan Review completed on: 12/12/17; Client turned her safety plan in on 12/13/17 (later than expected) as she was unsure how to answer some of the questions.  Therefore, client's Acknowledgement of Current Treatment Plan was signed by client and counseling intern on 12/13/17.      Attendance Dates: 12/27/17    Total # of Group Sessions:  Orientation on 11/22/17 + 7     MONDAY TUESDAY WEDNESDAY THURSDAY FRIDAY SATURDAY SUNDAY Total   Group Therapy   2hrs     2hrs   Specialty Groups*           1:1   1hr     1hr   Family Program           [place]             Phase II             Absent           Total   3hrs     3hrs     *Specialty Groups include Mental Health Care, Assertiveness and Communication, Sobriety Maintenance Skills, Spiritual Care, Stress Management, Relapse Prevention, Family Systems.                    Learning Style:  Visual    Staff member contributing:  Marely Wilkerson, MS, LADC, LPCC    Received supervision:  No    Client:  contributed to goals and plan    Did Client receive a copy of treatment plan/revised plan:  Yes    Changes to Treatment Plan:  No    Client agrees with plan/revised plan:  Yes    Any changes in Vulnerable Adult Status:  No    Substance Use Disorders:  None and Alcohol Use Disorder Mild (F10.10)      [facility] Risk Ratings and Data       DIMENSION 1: Acute Intoxication/Withdrawal  The client's ability to cope with withdrawal symptoms and current state of intoxication       Acute Intoxication/Withdrawal - Current Risk Factor:  0    Reporting sober date of 9/21/17    Goals:  Client will develop effective strategies to maintain abstinence.    Data:  Client did not demonstrate or report any signs/symptoms of intoxication or withdrawal.        DIMENSION 2:  Biomedical Conditions and Complaints  The degree to which any physical disorder would interfere with treatment for substance abuse and the client's ability to tolerate any related discomfort  "    Biomedical Conditions and Complaints - Current Risk Factor:  0    Goals: Client will continue to effectively manage her overall physical health    Data:  Client denied any current physical health concerns.  Client does report having a PCP.  Client rated her overall health at a 9, with 10 being the highest.            DIMENSION 3:  Emotional/Behavioral/Cognitive Conditions and Complications  The degree to which any condition or complications are likely to interfere with treatment for substance abuse or with function in significant life areas and the likelihood of risk of harm to self or others.     Emotional/Behavioral - Current Risk Factor:  1    DSM-5 Diagnoses:   Client completed a Diagnostic Assessment with this  on 12/27/17.  No mental health diagnoses were evident.  Client reports no past or current mental health concerns.      Suicide Assessment:  Risk Status    Ideation - Active thoughts of suicide Intent to follow through on suicide Plan for completing suicide    Yes No Yes No Yes No   Emergent  X  X  X   Urgent / Non-Emergent  X  X  X   Non- Urgent  X  X  X   No Current/Active Risk   X  X  X     Goals: Client will gain insight regarding her interest in various career fields and gain awareness regarding her identity to focus on what in particular she wants to improve in herself.     Data:  Client denied any past or current SI and SIB.      Client reported some stress due to starting house arrest the afternoon of 12/27/17 and feeling \"anxious and tired\".  She stated that her boyfriend and family have continued to provide her with much support.     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 Client has the following risk factors: legal issues, and reported that she is socially influenced when it comes to using alcohol, and has used it in the past to enjoy herself.       DIMENSION 4:  Readiness to Change  Consider the amount " "of support and encouragement necessary to keep the client involved in treatment.     Readiness to Change - Current Risk Factor:  0    Goals:  Client will verbalize the benefits of this outpatient treatment program.               Client will gain insight regarding her motivation towards the changes she wants to achieve, and develop               realistic goals to achieve the financial changes she wants to make in her life.     Data: Client reported on her check-in sheet from 12/27/17 that her main motivation for sobriety is her health; she rated her motivation level at an 8 (with 10 being the highest).  She noted that \"nothing\" currently blocks her motivation for sobriety.  Client has been verbalizing the benefits of outpatient treatment in group sessions.          DIMENSION 5:  Relapse/Continued Use/Continued Problem Potential  Consider the degree to which the client recognizes relapse issues and has the skills to prevent relapse of either substance use or mental health problems.     Relapse/Continued Use/Continued Problem Potential - Current Risk Factor:  2    Goals:  Client will gain insight regarding relapse prevention skills and will demonstrate these skills and how she can apply them in social situation.    Data:  Client rated her cravings over the last week at a level 1, with 10 being the highest.  She noted that staying busy helps decrease her cravings; she noted that people drinking at [place] was a big trigger for her.  Client reported in group that engaging in physical activities such as ice skating and skiing can help her manage stress.        DIMENSION 6:  Recovery Environment  Consider the degree to which key areas of the client's life are supportive of or antagonistic to treatment participation and recovery.     Recovery Environment - Current Risk Factor:  2    Support group attended this week:  No    Did family agree to attend family week:  No    If yes:  none schedule this week    Goals:  Client " "will follow all directives from her , once she acquires a specific  and will expand her sober support network.     Data:  Client reported no sober support group attendance this past week. Client reported on her check-in sheet from 12/18/17 that she does not need the extra support of an AA meeting right now.  Client reported that her father is supportive of her and her treatment and that he completed outpatient treatment at [place] around 3 years ago. Client also mentioned that she has an interlock device in her car and considers this to be her \"antabuse.\"  Client noted on her check-in sheet that she engaged in the following sober activities over the last week: movies, knitting, ice skating, and reading.          D: Response to peer's assignment \"Identity Map\"     Intervention:  Client was asked to share ways in which she related to her peer.      Assessment:  Stages of Change Model  Contemplation   Client appears to want to make sober friends; however, she finds it difficult to meet people.  It appears that client may lack outlets and ideas for how to meet friends that are sober and share similar interests.        Plan:  Attend group therapy on 1/2/18.  Begin house arrest on 12/27/17.  Continue engaging in sober fun activities.  Continue to develop ideas in group therapy on how to meet sober friends.    "

## 2017-12-28 NOTE — PROGRESS NOTES
Case consultation:  D)  Therapist presented ct's case for review.  DOC-alcohol.  Ct has DWI and has begun house arrest.  DA revealed no mh dx. P)  Continue tx plan.    Soraida Hawkins, SOPHY,LICSW, [facility]  Clinical

## 2018-01-02 ENCOUNTER — HOSPITAL ENCOUNTER (OUTPATIENT)
Dept: BEHAVIORAL HEALTH | Facility: CLINIC | Age: 23
End: 2018-01-02
Attending: SOCIAL WORKER
Payer: COMMERCIAL

## 2018-01-02 PROCEDURE — H2035 A/D TX PROGRAM, PER HOUR: HCPCS | Mod: HQ

## 2018-01-03 ENCOUNTER — HOSPITAL ENCOUNTER (OUTPATIENT)
Dept: BEHAVIORAL HEALTH | Facility: CLINIC | Age: 23
End: 2018-01-03
Attending: SOCIAL WORKER
Payer: COMMERCIAL

## 2018-01-03 PROCEDURE — H2035 A/D TX PROGRAM, PER HOUR: HCPCS | Mod: HQ

## 2018-01-04 NOTE — PROGRESS NOTES
CD ADULT Progress Note     Treatment Plan Review completed on: 1/4/2018    Client turned her safety plan in on 12/13/17 (later than expected) as she was unsure how to answer some of the questions.  Therefore, client's Acknowledgement of Current Treatment Plan was signed by client and counseling intern on 12/13/17.      Attendance Dates: 1/2/18, 1/3/18    Total # of Group Sessions:  Orientation on 11/22/17 + 8     MONDAY TUESDAY WEDNESDAY THURSDAY FRIDAY SATURDAY SUNDAY Total   Group Therapy  2hrs 2hrs     4hrs   Specialty Groups*           1:1           Family Program           [place]             Phase II             Absent           Total   4hrs     4hrs     *Specialty Groups include Mental Health Care, Assertiveness and Communication, Sobriety Maintenance Skills, Spiritual Care, Stress Management, Relapse Prevention, Family Systems.                    Learning Style:  Visual    Staff member contributing:  Marely Wilkerson, MS, LADC, LPCC; SONJA Swanson    Received supervision:  No    Client:  contributed to goals and plan    Did Client receive a copy of treatment plan/revised plan:  Yes    Changes to Treatment Plan:  No    Client agrees with plan/revised plan:  Yes    1) Care Coordination Activities:  no  2) Medical, Mental Health and other appointments the client attended: none  3) Medication issues: no change  4) Physical and mental health problems: no change  5) Review and evaluation of the individual abuse prevention plan: NA    Any changes in Vulnerable Adult Status:  No    Substance Use Disorders:  None and Alcohol Use Disorder Mild (F10.10)      ASAM Risk Ratings and Data       DIMENSION 1: Acute Intoxication/Withdrawal  The client's ability to cope with withdrawal symptoms and current state of intoxication       Acute Intoxication/Withdrawal - Current Risk Factor:  0    Reporting sober date of 9/21/17    Goals:  Client will develop effective strategies to maintain abstinence.    Data:  Client did not  "demonstrate or report any signs/symptoms of intoxication or withdrawal.  Client confirmed sobriety date.      DIMENSION 2:  Biomedical Conditions and Complaints  The degree to which any physical disorder would interfere with treatment for substance abuse and the client's ability to tolerate any related discomfort     Biomedical Conditions and Complaints - Current Risk Factor:  0    Goals: Client will continue to effectively manage her overall physical health    Data:  Client denied any current physical health concerns.  Client does report having a PCP.  Client rated her overall health at a 9, with 10 being the highest.  She endorses \"sleeping\" as a self-care activity.       DIMENSION 3:  Emotional/Behavioral/Cognitive Conditions and Complications  The degree to which any condition or complications are likely to interfere with treatment for substance abuse or with function in significant life areas and the likelihood of risk of harm to self or others.     Emotional/Behavioral - Current Risk Factor:  1    DSM-5 Diagnoses:   Client completed a Diagnostic Assessment with this  on 12/27/17.  No mental health diagnoses were evident.  Client reports no past or current mental health concerns.      Suicide Assessment:  Risk Status    Ideation - Active thoughts of suicide Intent to follow through on suicide Plan for completing suicide    Yes No Yes No Yes No   Emergent  X  X  X   Urgent / Non-Emergent  X  X  X   Non- Urgent  X  X  X   No Current/Active Risk   X  X  X     Goals: Client will gain insight regarding her interest in various career fields and gain awareness regarding her identity to focus on what in particular she wants to improve in herself.     Data:  Client denied any past or current SI and SIB.      Client reported some stress due to starting house arrest 12/27/17 and feeling \"bored and tired\".  She stated that her boyfriend and family have continued to provide her with much support.     Client has the " "following protective factors: supportive family and boyfriend, employed, [email or web address]. Client has the following risk factors: legal issues, and reported that she is socially influenced when it comes to using alcohol, and has used it in the past to enjoy herself.       DIMENSION 4:  Readiness to Change  Consider the amount of support and encouragement necessary to keep the client involved in treatment.     Readiness to Change - Current Risk Factor:  0    Goals:  Client will verbalize the benefits of this outpatient treatment program.               Client will gain insight regarding her motivation towards the changes she wants to achieve, and develop               realistic goals to achieve the financial changes she wants to make in her life.     Data: Client reported on her check-in sheet from 12/27/17 that her main motivation for sobriety is \"myself\"; she rated her motivation level at an 7 (with 10 being the highest).  She noted that \"nothing\" currently blocks her motivation for sobriety.         DIMENSION 5:  Relapse/Continued Use/Continued Problem Potential  Consider the degree to which the client recognizes relapse issues and has the skills to prevent relapse of either substance use or mental health problems.     Relapse/Continued Use/Continued Problem Potential - Current Risk Factor:  2    Goals:  Client will gain insight regarding relapse prevention skills and will demonstrate these skills and how she can apply them in social situation.    Data:  Client rated her cravings over the last week at a level 1, with 10 being the highest.  She noted that \"having a breathalyser I have to blow in 3 times a day\" helps decrease her cravings.  She noted that having lunch with her father was a trigger for her.  Client reported in group that engaging in  activities such as jigsaw puzzles can help her manage stress.        DIMENSION 6:  Recovery Environment  Consider the degree to which key areas of the " "client's life are supportive of or antagonistic to treatment participation and recovery.     Recovery Environment - Current Risk Factor:  2    Support group attended this week:  No    Did family agree to attend family week:  No    If yes:  none schedule this week    Goals:  Client will follow all directives from her , once she acquires a specific  and will expand her sober support network.     Data:  Client reported no sober support group attendance this past week. Client reported on her check-in sheet that she does not need the extra support of AA meetings right now.  Client reported that her father is supportive of her and her treatment and that he completed outpatient treatment at [facility] around 3 years ago. Client also mentioned that she has an interlock device in her car and is under house arrest.  Client noted on her check-in sheet that she engaged in the following sober activities over the last week: Netflix, puzzles and knitting.   She rates her living situation as 1 (best) due to \"no alcohol in home, I live with a supportive friend\".       D: Managing her low point over the past week using distress tolerance skills. Sober activities development and stress reduction skills.     Intervention:  Group question- How did you manage any low points or triggers over the holiday weekend?   What [employer] skill could you use in times of distress?   Brainstorming session to develop sober activities.  Presentation and practice of deep breathing, progressive muscle relaxation, body scan, and guided imagery.    Assessment:  Stages of Change Model  Contemplation   Client appears to want to make sober friends; however, she finds it difficult to meet people.  It appears that client may lack outlets and ideas for how to meet friends that are sober and share similar interests.        Plan:  Attend group therapy on 1/8-10/18.    Comply with requirements of house arrest.    Continue " engaging in sober fun activities.    Continue to develop ideas in group therapy on how to meet sober friends.

## 2018-01-08 ENCOUNTER — HOSPITAL ENCOUNTER (OUTPATIENT)
Dept: BEHAVIORAL HEALTH | Facility: CLINIC | Age: 23
End: 2018-01-08
Attending: SOCIAL WORKER
Payer: COMMERCIAL

## 2018-01-08 PROCEDURE — H2035 A/D TX PROGRAM, PER HOUR: HCPCS | Mod: HQ

## 2018-01-08 NOTE — PROGRESS NOTES
CD ADULT Progress Note     Treatment Plan Review completed on: 1/8/2018    Client turned her safety plan in on 12/13/17 (later than expected) as she was unsure how to answer some of the questions.  Therefore, client's Acknowledgement of Current Treatment Plan was signed by client and counseling intern on 12/13/17.      Attendance Dates: 1/8/18, 1/9/18, 1/10/18    Total # of Group Sessions:  Orientation on 11/22/17 + 12     MONDAY TUESDAY WEDNESDAY THURSDAY FRIDAY SATURDAY SUNDAY Total   Group Therapy 2hrs 2hrs 2hrs     6hrs   Specialty Groups*           1:1           Family Program           [place]             Phase II             Absent           Total 2hrs 2hrs 2hrs     6hrs     *Specialty Groups include Mental Health Care, Assertiveness and Communication, Sobriety Maintenance Skills, Spiritual Care, Stress Management, Relapse Prevention, Family Systems.                    Learning Style:  Visual    Staff member contributing:  Marely Wilkerson, MS, LADC, LPCC    Received supervision:  No    Client:  contributed to goals and plan    Did Client receive a copy of treatment plan/revised plan:  Yes    Changes to Treatment Plan:  No    Client agrees with plan/revised plan:  Yes    1) Care Coordination Activities:  no  2) Medical, Mental Health and other appointments the client attended: none  3) Medication issues: no change  4) Physical and mental health problems: no change  5) Review and evaluation of the individual abuse prevention plan: NA    Any changes in Vulnerable Adult Status:  No    Substance Use Disorders:  None and Alcohol Use Disorder Mild (F10.10)      ASAM Risk Ratings and Data       DIMENSION 1: Acute Intoxication/Withdrawal  The client's ability to cope with withdrawal symptoms and current state of intoxication       Acute Intoxication/Withdrawal - Current Risk Factor:  0    Reporting sober date of 9/21/17    Goals:  Client will develop effective strategies to maintain abstinence.    Data:  Client did  "not demonstrate or report any signs/symptoms of intoxication or withdrawal.  Client confirmed sobriety date.      DIMENSION 2:  Biomedical Conditions and Complaints  The degree to which any physical disorder would interfere with treatment for substance abuse and the client's ability to tolerate any related discomfort     Biomedical Conditions and Complaints - Current Risk Factor:  0    Goals: Client will continue to effectively manage her overall physical health    Data:  Client denied any current physical health concerns.  Client does report having a PCP.  Client rated her overall health at a 9, with 10 being the highest.  She endorses \"sleeping\" as a self-care activity.       DIMENSION 3:  Emotional/Behavioral/Cognitive Conditions and Complications  The degree to which any condition or complications are likely to interfere with treatment for substance abuse or with function in significant life areas and the likelihood of risk of harm to self or others.     Emotional/Behavioral - Current Risk Factor:  1    DSM-5 Diagnoses:   Client completed a Diagnostic Assessment with this  on 12/27/17.  No mental health diagnoses were evident.  Client reports no past or current mental health concerns.      Suicide Assessment:  Risk Status    Ideation - Active thoughts of suicide Intent to follow through on suicide Plan for completing suicide    Yes No Yes No Yes No   Emergent  X  X  X   Urgent / Non-Emergent  X  X  X   Non- Urgent  X  X  X   No Current/Active Risk   X  X  X     Goals: Client will gain insight regarding her interest in various career fields and gain awareness regarding her identity to focus on what in particular she wants to improve in herself.     Data:  Client denied any past or current SI and SIB.      Client reported less stress from house arrest and adjusting.  She reported feeling \"awake-happy\".  She stated that her boyfriend and family have continued to provide her with much support.     Client has " "the following protective factors: supportive family and boyfriend, employed, [place]. Client has the following risk factors: legal issues, and reported that she is socially influenced when it comes to using alcohol, and has used it in the past to enjoy herself.       DIMENSION 4:  Readiness to Change  Consider the amount of support and encouragement necessary to keep the client involved in treatment.     Readiness to Change - Current Risk Factor:  0    Goals:  Client will verbalize the benefits of this outpatient treatment program.               Client will gain insight regarding her motivation towards the changes she wants to achieve, and develop               realistic goals to achieve the financial changes she wants to make in her life.     Data: Client reported on her check-in sheet from 1/8/18 that her main motivation for sobriety is \"my energy level\"; she rated her motivation level at a 10 (with 10 being the highest).  She noted that \"nothing\" currently blocks her motivation for sobriety.         DIMENSION 5:  Relapse/Continued Use/Continued Problem Potential  Consider the degree to which the client recognizes relapse issues and has the skills to prevent relapse of either substance use or mental health problems.     Relapse/Continued Use/Continued Problem Potential - Current Risk Factor:  2    Goals:  Client will gain insight regarding relapse prevention skills and will demonstrate these skills and how she can apply them in social situation.    Data:  Client rated her cravings over the last week at a level 2, with 10 being the highest.  She noted that \"staying away from people who I used to drink with\" helps decrease her cravings.  She noted that she did not experience any triggers this past week.  Client reported watching lots of movies this past week as she is on house arrest.        DIMENSION 6:  Recovery Environment  Consider the degree to which key areas of the client's life are " "supportive of or antagonistic to treatment participation and recovery.     Recovery Environment - Current Risk Factor:  2    Support group attended this week:  No    Did family agree to attend family week:  No    If yes:  none schedule this week    Goals:  Client will follow all directives from her , once she acquires a specific  and will expand her sober support network.     Data:  Client reported no sober support group attendance this past week. Client reported on her check-in sheet that she does not need the extra support of AA meetings right now.  Client reported that her father is supportive of her and her treatment and that he completed outpatient treatment at [facility] around 3 years ago. Client also mentioned that she has an interlock device in her car and is under house arrest.  Client noted on her check-in sheet that she engaged in the following sober activities over the last week: lots of movies.   She rates her living situation as 1 (best) due to \"no alcohol is ever present in my apartment\".       D: DBT Skill PLEASE MASTER- specifically Build Mastery     Intervention:  Group question- How can you Build Mastery today?      Assessment:  Stages of Change Model  Contemplation   Client stated that she can Build Mastery by \"breaking down her house arrest\".  She stated that it is helpful for her to remember that she has completed 2 weeks of house arrest, which is 1/3 of her house arrest timeframe.  Client appears to be able to use logic about her current situation, which helps make it more bearable.        Plan:  Attend group therapy on 1/15/18.    Comply with requirements of house arrest.    Continue engaging in sober fun activities.    Continue to develop ideas in group therapy on how to meet sober friends.    "

## 2018-01-09 ENCOUNTER — HOSPITAL ENCOUNTER (OUTPATIENT)
Dept: BEHAVIORAL HEALTH | Facility: CLINIC | Age: 23
End: 2018-01-09
Attending: SOCIAL WORKER
Payer: COMMERCIAL

## 2018-01-09 PROCEDURE — H2035 A/D TX PROGRAM, PER HOUR: HCPCS | Mod: HQ

## 2018-01-10 ENCOUNTER — HOSPITAL ENCOUNTER (OUTPATIENT)
Dept: BEHAVIORAL HEALTH | Facility: CLINIC | Age: 23
End: 2018-01-10
Attending: SOCIAL WORKER
Payer: COMMERCIAL

## 2018-01-10 PROCEDURE — H2035 A/D TX PROGRAM, PER HOUR: HCPCS | Mod: HQ

## 2018-01-10 NOTE — ADDENDUM NOTE
Encounter addended by: Marely Wilkerson [facility] on: 1/10/2018  2:10 PM<BR>     Actions taken: Sign clinical note

## 2018-01-15 ENCOUNTER — HOSPITAL ENCOUNTER (OUTPATIENT)
Dept: BEHAVIORAL HEALTH | Facility: CLINIC | Age: 23
End: 2018-01-15
Attending: SOCIAL WORKER
Payer: COMMERCIAL

## 2018-01-15 PROCEDURE — H2035 A/D TX PROGRAM, PER HOUR: HCPCS | Mod: HQ

## 2018-01-16 ENCOUNTER — HOSPITAL ENCOUNTER (OUTPATIENT)
Dept: BEHAVIORAL HEALTH | Facility: CLINIC | Age: 23
End: 2018-01-16
Attending: SOCIAL WORKER
Payer: COMMERCIAL

## 2018-01-16 PROCEDURE — H2035 A/D TX PROGRAM, PER HOUR: HCPCS | Mod: HQ

## 2018-01-17 ENCOUNTER — HOSPITAL ENCOUNTER (OUTPATIENT)
Dept: BEHAVIORAL HEALTH | Facility: CLINIC | Age: 23
End: 2018-01-17
Attending: SOCIAL WORKER
Payer: COMMERCIAL

## 2018-01-17 PROCEDURE — H2035 A/D TX PROGRAM, PER HOUR: HCPCS | Mod: HQ

## 2018-01-18 NOTE — PROGRESS NOTES
"  CD ADULT Progress Note     Treatment Plan Review completed on: 1/18/2018    Client turned her safety plan in on 12/13/17 (later than expected) as she was unsure how to answer some of the questions.  Therefore, client's Acknowledgement of Current Treatment Plan was signed by client and counseling intern on 12/13/17.      Attendance Dates: 1/15/18, 1/16/18, 1/17/18    Total # of Group Sessions:  Orientation on 11/22/17 + 15     MONDAY TUESDAY WEDNESDAY THURSDAY FRIDAY SATURDAY SUNDAY Total   Group Therapy 2hrs 1hrs 2hrs     5hrs   Specialty Groups*           1:1           Family Program           [place]             Phase II             Absent           Total 2hrs 1hrs 2hrs     5hrs     *Specialty Groups include Mental Health Care, Assertiveness and Communication, Sobriety Maintenance Skills, Spiritual Care, Stress Management, Relapse Prevention, Family Systems.                    Learning Style:  Visual    Staff member contributing:  Marely Wilkerson, MS, LADC, LPCC    Received supervision:  No    Client:  contributed to goals and plan    Did Client receive a copy of treatment plan/revised plan:  Yes    Changes to Treatment Plan:  No    Client agrees with plan/revised plan:  Yes    1) Care Coordination Activities:  no  2) Medical, Mental Health and other appointments the client attended: none  3) Medication issues: no change  4) Physical and mental health problems: Client reported she was \"sick all weekend\" on her check-in sheet from 1/15/18.  5) Review and evaluation of the individual abuse prevention plan: NA    Any changes in Vulnerable Adult Status:  No    Substance Use Disorders:  None and Alcohol Use Disorder Mild (F10.10)      ASAM Risk Ratings and Data       DIMENSION 1: Acute Intoxication/Withdrawal  The client's ability to cope with withdrawal symptoms and current state of intoxication       Acute Intoxication/Withdrawal - Current Risk Factor:  0    Reporting sober date of 9/21/17    Goals:  Client will " "develop effective strategies to maintain abstinence.    Data:  Client did not demonstrate or report any signs/symptoms of intoxication or withdrawal.  Client confirmed sobriety date.      DIMENSION 2:  Biomedical Conditions and Complaints  The degree to which any physical disorder would interfere with treatment for substance abuse and the client's ability to tolerate any related discomfort     Biomedical Conditions and Complaints - Current Risk Factor:  0    Goals: Client will continue to effectively manage her overall physical health    Data: Client reported she was \"sick all weekend\" on her check-in sheet from 1/15/18. Client does report having a PCP.  Client rated her overall health at a 5, with 10 being the highest.  She endorses \"sleeping\" as a self-care activity.       DIMENSION 3:  Emotional/Behavioral/Cognitive Conditions and Complications  The degree to which any condition or complications are likely to interfere with treatment for substance abuse or with function in significant life areas and the likelihood of risk of harm to self or others.     Emotional/Behavioral - Current Risk Factor:  1    DSM-5 Diagnoses:   Client completed a Diagnostic Assessment with this  on 12/27/17.  No mental health diagnoses were evident.  Client reports no past or current mental health concerns.      Suicide Assessment:  Risk Status    Ideation - Active thoughts of suicide Intent to follow through on suicide Plan for completing suicide    Yes No Yes No Yes No   Emergent  X  X  X   Urgent / Non-Emergent  X  X  X   Non- Urgent  X  X  X   No Current/Active Risk   X  X  X     Goals: Client will gain insight regarding her interest in various career fields and gain awareness regarding her identity to focus on what in particular she wants to improve in herself.     Data:  Client denied any past or current SI and SIB.      Client reported in group therapy that she is becoming inpatient due to being in house arrest and not " "being able to go out and engage in outside activities, such as going to the gym. Client was able to process her past experience regarding her ex-boyfriend, and reported that he was \"controlling,\" and mentioned how much he influenced her negatively in her decisions, such as not \"allowing\" her to go to college. Client also reported that now that she is in a different and healthier relationship, she is starting to explore career options and thinking about attending college in the future. Client reported that she is currently working on her career clusters worksheet assignment due to on 1/24/18.    Client also reported that she finds mindful breathing helpful.     Client shared her autobiography assignment on 1/17/18.    Client has the following protective factors: supportive family and boyfriend, employed, [email or web address]. Client has the following risk factors: legal issues, and reported that she is socially influenced when it comes to using alcohol, and has used it in the past to enjoy herself.       DIMENSION 4:  Readiness to Change  Consider the amount of support and encouragement necessary to keep the client involved in treatment.     Readiness to Change - Current Risk Factor:  0    Goals:  Client will verbalize the benefits of this outpatient treatment program.               Client will gain insight regarding her motivation towards the changes she wants to achieve, and develop               realistic goals to achieve the financial changes she wants to make in her life.     Data: Client reported on her check-in sheet from 1/15/18 that her main motivation for sobriety is \"to maintain good relationships\"; she rated her motivation level at a 10 (with 10 being the highest).  She noted that \"nothing\" currently blocks her motivation for sobriety.         DIMENSION 5:  Relapse/Continued Use/Continued Problem Potential  Consider the degree to which the client recognizes relapse issues and has the skills to " "prevent relapse of either substance use or mental health problems.     Relapse/Continued Use/Continued Problem Potential - Current Risk Factor:  2    Goals:  Client will gain insight regarding relapse prevention skills and will demonstrate these skills and how she can apply them in social situation.    Data:  Client rated her cravings over the last week at a level 2, with 10 being the highest.  She noted that \"not surrounding myself with people who drink a lot\" helps decrease her cravings.  She noted that \"going to the Wild game\" and staying sober during the game was her biggest trigger this past week.  Client continued to reported watching lots of movies this past week as she is on house arrest.        DIMENSION 6:  Recovery Environment  Consider the degree to which key areas of the client's life are supportive of or antagonistic to treatment participation and recovery.     Recovery Environment - Current Risk Factor:  2    Support group attended this week:  No    Did family agree to attend family week:  No    If yes:  none schedule this week    Goals:  Client will follow all directives from her , once she acquires a specific  and will expand her sober support network.     Data:  Client demonstrated interest in attending a sober support meeting, and mentioned that she may attend a sober support group called \"[facility],\" as suggested by a group member. Client continued to report that her parents are very supportive of her. Client also mentioned that she is making plans to move to [place] in the near future to have a new start.        D: Autobiography     Intervention: Client shared her autobiography narrative in group theapy    Assessment:  Stages of Change Model  Contemplation   Client reported that both her parents drink and this had an affect on her since she was younger. Client mentioned that she started drinking in middle school. Client also mentioned that she had a " "\"controlling\" boyfriend in high school and affected her decision making and confidence. Client reported that she dated him for about 4 to 5 years. Client appeared to have awareness and insight regarding how much of a negative influence her ex-boyfriend was on her, to include her career choices. Client demonstrated high motivation towards change.     Plan:  Attend group therapy on 1/22/18.    Comply with requirements of house arrest.    Continue exploring career possibilities     Continue to explore sober support meetings   "

## 2018-01-22 ENCOUNTER — HOSPITAL ENCOUNTER (OUTPATIENT)
Dept: BEHAVIORAL HEALTH | Facility: CLINIC | Age: 23
End: 2018-01-22
Attending: SOCIAL WORKER
Payer: COMMERCIAL

## 2018-01-22 PROCEDURE — H2035 A/D TX PROGRAM, PER HOUR: HCPCS | Mod: HQ

## 2018-01-24 ENCOUNTER — HOSPITAL ENCOUNTER (OUTPATIENT)
Dept: BEHAVIORAL HEALTH | Facility: CLINIC | Age: 23
End: 2018-01-24
Attending: SOCIAL WORKER
Payer: COMMERCIAL

## 2018-01-24 PROCEDURE — H2035 A/D TX PROGRAM, PER HOUR: HCPCS | Mod: HQ

## 2018-01-30 ENCOUNTER — HOSPITAL ENCOUNTER (OUTPATIENT)
Dept: BEHAVIORAL HEALTH | Facility: CLINIC | Age: 23
End: 2018-01-30
Attending: SOCIAL WORKER
Payer: COMMERCIAL

## 2018-01-30 PROCEDURE — H2035 A/D TX PROGRAM, PER HOUR: HCPCS | Mod: HQ

## 2018-01-31 ENCOUNTER — HOSPITAL ENCOUNTER (OUTPATIENT)
Dept: BEHAVIORAL HEALTH | Facility: CLINIC | Age: 23
End: 2018-01-31
Attending: SOCIAL WORKER
Payer: COMMERCIAL

## 2018-01-31 PROCEDURE — H2035 A/D TX PROGRAM, PER HOUR: HCPCS | Mod: HQ

## 2018-02-01 NOTE — PROGRESS NOTES
CD ADULT Progress Note     Treatment Plan Review completed on: 2/1/2018    Client turned her safety plan in on 12/13/17 (later than expected) as she was unsure how to answer some of the questions.  Therefore, client's Acknowledgement of Current Treatment Plan was signed by client and counseling intern on 12/13/17.      Attendance Dates: 1/30/18, 1/31/18    Total # of Group Sessions:  Orientation on 11/22/17 + 19     MONDAY TUESDAY WEDNESDAY THURSDAY FRIDAY SATURDAY SUNDAY Total   Group Therapy Unexcused Absence 2hrs  2hrs     4hrs   Specialty Groups*           1:1           Family Program           [place]             Phase II             Absent           Total  2hrs 2hrs     4hrs     *Specialty Groups include Mental Health Care, Assertiveness and Communication, Sobriety Maintenance Skills, Spiritual Care, Stress Management, Relapse Prevention, Family Systems.                    Learning Style:  Visual    Staff member contributing:  Marely Wilkerson, MS, LADC, LPCC    Received supervision:  No    Client:  contributed to goals and plan    Did Client receive a copy of treatment plan/revised plan:  Yes    Changes to Treatment Plan:  No    Client agrees with plan/revised plan:  Yes    1) Care Coordination Activities:  no  2) Medical, Mental Health and other appointments the client attended: client reported that she went to the doctor to regarding her stomach pain.   3) Medication issues: no change  4) Physical and mental health problems: See dimension 2  5) Review and evaluation of the individual abuse prevention plan: NA    Any changes in Vulnerable Adult Status:  No    Substance Use Disorders:  None and Alcohol Use Disorder Mild (F10.10)      ASAM Risk Ratings and Data       DIMENSION 1: Acute Intoxication/Withdrawal  The client's ability to cope with withdrawal symptoms and current state of intoxication       Acute Intoxication/Withdrawal - Current Risk Factor:  0    Reporting sober date of 9/21/17    Goals:  Client  "will develop effective strategies to maintain abstinence.    Data:  Client did not demonstrate or report any signs/symptoms of intoxication or withdrawal.  Client confirmed sobriety date.      DIMENSION 2:  Biomedical Conditions and Complaints  The degree to which any physical disorder would interfere with treatment for substance abuse and the client's ability to tolerate any related discomfort     Biomedical Conditions and Complaints - Current Risk Factor:  0    Goals: Client will continue to effectively manage her overall physical health    Data: Client reported she was experiencing stomach pain and went to the ER on Tuesday 1/23/18. Client reported that she may have Chron's disease, however, she is waiting for an answer from her doctor regarding this. Client does report having a PCP.  Client rated her overall health at a 9, with 10 being the highest, on her check-in sheet from 1/30/18.  She endorses \"sleeping\" as a self-care activity and that she is currently taking antibiotics.       DIMENSION 3:  Emotional/Behavioral/Cognitive Conditions and Complications  The degree to which any condition or complications are likely to interfere with treatment for substance abuse or with function in significant life areas and the likelihood of risk of harm to self or others.     Emotional/Behavioral - Current Risk Factor:  1    DSM-5 Diagnoses:   Client completed a Diagnostic Assessment with this  on 12/27/17.  No mental health diagnoses were evident.  Client reports no past or current mental health concerns.      Suicide Assessment:  Risk Status    Ideation - Active thoughts of suicide Intent to follow through on suicide Plan for completing suicide    Yes No Yes No Yes No   Emergent  X  X  X   Urgent / Non-Emergent  X  X  X   Non- Urgent  X  X  X   No Current/Active Risk   X  X  X     Goals: Client will gain insight regarding her interest in various career fields and gain awareness regarding her identity to focus on " "what in particular she wants to improve in herself.     Data:  Client denied any past or current SI and SIB.      Client noted on her check-in sheet that she has been feeling \"tired and hopeful\".  She denied experiencing any mental health symptoms.  She did express that she is very ready to be done with her house arrest and that her bedroom has become like a \"care home\" to her.  Client stated that her house arrest will be finished the end of next week; she is looking forward to this.     Client has the following protective factors: supportive family and boyfriend, employed, has her license in [place]. Client has the following risk factors: legal issues, and reported that she is socially influenced when it comes to using alcohol, and has used it in the past to enjoy herself.       DIMENSION 4:  Readiness to Change  Consider the amount of support and encouragement necessary to keep the client involved in treatment.     Readiness to Change - Current Risk Factor:  0    Goals:  Client will verbalize the benefits of this outpatient treatment program.               Client will gain insight regarding her motivation towards the changes she wants to achieve, and develop               realistic goals to achieve the financial changes she wants to make in her life.     Data: Client reported on her check-in sheet from 1/30/18 that her main motivation for sobriety is \"my health and relationships\"; she rated her motivation level at a 10 (with 10 being the highest).  She noted that \"nothing\" currently blocks her motivation for sobriety.      Client reported that during her ER visit due to her stomach pain on 1/23/18, she felt ashamed having to explain that she has a house arrest device on her ankle, and that she needs to become honest with herself about this situation.      DIMENSION 5:  Relapse/Continued Use/Continued Problem Potential  Consider the degree to which the client recognizes relapse issues and has the skills to prevent " "relapse of either substance use or mental health problems.     Relapse/Continued Use/Continued Problem Potential - Current Risk Factor:  2    Goals:  Client will gain insight regarding relapse prevention skills and will demonstrate these skills and how she can apply them in social situation.    Data:  Client rated her cravings over the last week at a level 2, with 10 being the highest.  She noted that \"knowing it's not good for me\" helps decrease her cravings.  She noted that \"going out to eat\" was her biggest trigger this past week.  Client completed her relapse warning signs assignment on 1/22/18 and shared her experience with the group.     DIMENSION 6:  Recovery Environment  Consider the degree to which key areas of the client's life are supportive of or antagonistic to treatment participation and recovery.     Recovery Environment - Current Risk Factor:  2    Support group attended this week:  No    Did family agree to attend family week:  No    If yes:  none schedule this week    Goals:  Client will follow all directives from her , once she acquires a specific  and will expand her sober support network.     Data:  Client mentioned that she looked through her AA book to find a meeting. Client acquired information about an AA meeting called \"[facility]\" from a group member and plans on attending the AA group after her house arrest is completed; otherwise, she would have to take up part of her 4 hour designated free time allotted by court. Client rated her living situation a 2 out of 10, with 10 being the most stressful, on her check-in sheet from 1/30/18, as she noted that her roommate is \"messy\".        D: Interpersonal Effectiveness Skills      Intervention: Client was asked to develop a SMART goal     Assessment:  Stages of Change Model  Contemplation   Client stated that she would like to reach out to people more and that once she's off house arrest, she can have lunch with " a different friend once per week.      Plan:  Attend group therapy on 2/5/18.    Comply with requirements of house arrest.     Continue to engage in self-care activities

## 2018-02-06 ENCOUNTER — HOSPITAL ENCOUNTER (OUTPATIENT)
Dept: BEHAVIORAL HEALTH | Facility: CLINIC | Age: 23
End: 2018-02-06
Attending: SOCIAL WORKER
Payer: COMMERCIAL

## 2018-02-06 PROCEDURE — H2035 A/D TX PROGRAM, PER HOUR: HCPCS

## 2018-02-06 PROCEDURE — H2035 A/D TX PROGRAM, PER HOUR: HCPCS | Mod: HQ

## 2018-02-06 NOTE — PROGRESS NOTES
D: Met with client on 2/6/18 for a 1:1 session.  Client's progress in meeting her treatment plan goals was reviewed and her plans for moving forward in recovery were reviewed.  Additionally, client was placed on a treatment contract as she had two unexcused absences over the last two weeks in Phase I.      I: Client was asked the reason for not calling in to report her absences from group the last two weeks.     A: Client is attending treatment due to legal reasons, and it seems that she is having some difficulty in holding herself accountable to her responsibilities and consequences.  Client admitted that it would have been best for her to call to report her absence; however, she stated that she feels like she's being treated as though she's in elementary school.      P: Client plans on finishing Phase I on 2/7/18 and moving on to Phase II.  She plans on contacting friends after completing house arrest on 2/9/18 to plan sober fun activities with them.

## 2018-02-07 ENCOUNTER — HOSPITAL ENCOUNTER (OUTPATIENT)
Dept: BEHAVIORAL HEALTH | Facility: CLINIC | Age: 23
End: 2018-02-07
Attending: SOCIAL WORKER
Payer: COMMERCIAL

## 2018-02-07 PROCEDURE — H2035 A/D TX PROGRAM, PER HOUR: HCPCS | Mod: HQ

## 2018-02-20 ENCOUNTER — HOSPITAL ENCOUNTER (OUTPATIENT)
Dept: BEHAVIORAL HEALTH | Facility: CLINIC | Age: 23
End: 2018-02-20
Attending: SOCIAL WORKER
Payer: COMMERCIAL

## 2018-02-20 PROCEDURE — H2035 A/D TX PROGRAM, PER HOUR: HCPCS | Mod: HQ

## 2018-02-21 NOTE — PROGRESS NOTES
**Late entry.  Client did not attend Phase II group on 2/13/18 due to being on vacation; this was an excused absence.  No changes to last Phase I progress note.    CD ADULT Progress Note     Treatment Plan Review completed on: 2/8/2018    Client turned her safety plan in on 12/13/17 (later than expected) as she was unsure how to answer some of the questions.  Therefore, client's Acknowledgement of Current Treatment Plan was signed by client and counseling intern on 12/13/17.      Phase II Attendance Dates: 0    Total # of Phase I Group Sessions:  Orientation on 11/22/17 + 21 sessions of Phase I     MONDAY TUESDAY WEDNESDAY THURSDAY FRIDAY SATURDAY SUNDAY Total   Group Therapy Unexcused Absence 2hrs  2hrs     4hrs   Specialty Groups*           1:1           Family Program           [place]             Phase II             Absent           Total Unexcused Absence 2hrs 2hrs     4hrs     *Specialty Groups include Mental Health Care, Assertiveness and Communication, Sobriety Maintenance Skills, Spiritual Care, Stress Management, Relapse Prevention, Family Systems.                    Learning Style:  Visual    Staff member contributing:  Marely Wilkerson MS, LADC, LPCC and Ruperto Reeves, Intern    Received supervision:  No    Client:  contributed to goals and plan    Did Client receive a copy of treatment plan/revised plan:  Yes    Changes to Treatment Plan:      Client agrees with plan/revised plan:  Yes    1) Care Coordination Activities:  no  2) Medical, Mental Health and other appointments the client attended: none  3) Medication issues: no change  4) Physical and mental health problems: See dimension 2  5) Review and evaluation of the individual abuse prevention plan: NA    Any changes in Vulnerable Adult Status:  No    Substance Use Disorders:  None and Alcohol Use Disorder Mild (F10.10)      ASAM Risk Ratings and Data       DIMENSION 1: Acute Intoxication/Withdrawal  The client's ability to cope with  "withdrawal symptoms and current state of intoxication       Acute Intoxication/Withdrawal - Current Risk Factor:  0    Reporting sober date of 9/21/17    Goals:  Client will develop effective strategies to maintain abstinence.    Data:  Client did not demonstrate or report any signs/symptoms of intoxication or withdrawal.  Client confirmed sobriety date.      DIMENSION 2:  Biomedical Conditions and Complaints  The degree to which any physical disorder would interfere with treatment for substance abuse and the client's ability to tolerate any related discomfort     Biomedical Conditions and Complaints - Current Risk Factor:  0    Goals: Client will continue to effectively manage her overall physical health    Data: Client reported she was experiencing stomach pain and went to the ER on Tuesday 1/23/18. Client reported that she may have Chron's disease, however, she is waiting for an answer from her doctor regarding this. Client does report having a PCP.  Client rated her overall health at an 8, with 10 being the highest, on her check-in sheet from 2/6/18.  She continues to endorse \"sleeping\" as a self-care activity, and reported \"no\" regarding any changes to her physical health this week.       DIMENSION 3:  Emotional/Behavioral/Cognitive Conditions and Complications  The degree to which any condition or complications are likely to interfere with treatment for substance abuse or with function in significant life areas and the likelihood of risk of harm to self or others.     Emotional/Behavioral - Current Risk Factor:  1    DSM-5 Diagnoses:   Client completed a Diagnostic Assessment with this  on 12/27/17.  No mental health diagnoses were evident.  Client reports no past or current mental health concerns.      Suicide Assessment:  Risk Status    Ideation - Active thoughts of suicide Intent to follow through on suicide Plan for completing suicide    Yes No Yes No Yes No   Emergent  X  X  X   Urgent / " "Non-Emergent  X  X  X   Non- Urgent  X  X  X   No Current/Active Risk   X  X  X     Goals: Client will gain insight regarding her interest in various career fields and gain awareness regarding her identity to focus on what in particular she wants to improve in herself.     Data:  Client denied any past or current SI and SIB.      Client noted on her check-in sheet from 2/6/18 that she has been feeling \"tired and anxious\".  She denied experiencing any mental health symptoms, other than rating \"excessive sleep\" at a 4, with 10 being the highest. Client reported that the first 10 minutes of her day, she struggles getting up in the morning, and mentioned that she \"snoozes\" her alarm too much and this leads to her sleeping in at times.     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 Client has the following risk factors: legal issues, and reported that she is socially influenced when it comes to using alcohol, and has used it in the past to enjoy herself.       DIMENSION 4:  Readiness to Change  Consider the amount of support and encouragement necessary to keep the client involved in treatment.     Readiness to Change - Current Risk Factor:  1    Goals:  Client will verbalize the benefits of this outpatient treatment program.               Client will gain insight regarding her motivation towards the changes she wants to achieve, and develop               realistic goals to achieve the financial changes she wants to make in her life.     Data: Client reported on her check-in sheet from 2/6/18 that her main motivation for sobriety is \"to stay out of trouble\"; she rated her motivation level at an 8 (with 10 being the highest).  She continues to note that \"nothing\" currently blocks her motivation for sobriety.  Client reported that she is ready to be done with group therapy and her legal issues, and be off house arrest. It appears that client's " "motivation to complete treatment is her legal issues and lacks motivation towards personal development and insight regarding the choices she has made around using alcohol.     Client's risk rating has changed from a 0 to a 1.  Client was placed on a treatment contract on 2/6/18 due to receiving two unexcused absences over the past week.  While client did struggle in taking responsibility for missing group, she has been an active group participant during Phase I and admits to having a problem with alcohol use.  Therefore, client will transition to Phase II beginning on 2/2/18.  During client's last Phase I group session on 2/7/18, she stated that spending time with friends and scheduling exercise time throughout the week will help her maintain consistent sober structure.       DIMENSION 5:  Relapse/Continued Use/Continued Problem Potential  Consider the degree to which the client recognizes relapse issues and has the skills to prevent relapse of either substance use or mental health problems.     Relapse/Continued Use/Continued Problem Potential - Current Risk Factor:  2    Goals:  Client will gain insight regarding relapse prevention skills and will demonstrate these skills and how she can apply them in social situation.    Data:  Client rated her cravings over the last week at a level 1, with 10 being the highest.  She noted that \"not hanging out with old friends\" helps decrease her cravings.  She noted \"I didn't really have one\" regarding her biggest trigger this past week. Client reported that staying away from the small things that lead to drinking is helpful for her.     DIMENSION 6:  Recovery Environment  Consider the degree to which key areas of the client's life are supportive of or antagonistic to treatment participation and recovery.     Recovery Environment - Current Risk Factor:  2    Support group attended this week:  No    Did family agree to attend family week:  No    If yes:  none schedule this " "week    Goals:  Client will follow all directives from her , once she acquires a specific  and will expand her sober support network.     Data:  Client reported that she did not attend a sober support group meeting this past week due to being on house arrest. Client reported that she will be getting off house arrest this Friday, 2/9/18. Client reported that she spoke with a new sober friend from work this past week. Client rated her living situation a 2 out of 10, with 10 being the most stressful, on her check-in sheet from 2/6/18, as she noted that her roommate \"keeps having people over late.\" Client also reported that she was going to [place] this weekend to visit her boyfriend.        D: Role Play      Intervention: Client was directed to role play with another member in the group a scenario where they struggle with communication by using the DBT skill DEAR MAN.     Assessment:  Stages of Change Model  Contemplation   Client reported that while doing the role-play, she felt that it was hard to express her emotions towards the other person. Client was able to engage in the role play and discuss what went well and what she could work on with the group.  It appears that client may struggle to express her emotions.       Plan:  Client completed Phase I on 2/7/18. Client will begin attending Phase II group therapy on 2/20/18.             Client reported that she will try to continue to apply and use the skill DEAR MAN in her life.     "

## 2018-02-21 NOTE — PROGRESS NOTES
**Late entry.  Client did not attend Phase II group on 2/13/18 due to being on vacation; this was an excused absence.  No changes to last Phase I progress note.    CD ADULT Progress Note     Treatment Plan Review completed on: 2/21/2018    Client turned her safety plan in on 12/13/17 (later than expected) as she was unsure how to answer some of the questions.  Therefore, client's Acknowledgement of Current Treatment Plan was signed by client and counseling intern on 12/13/17.      Phase II Attendance Dates: 2/20/18  Total # of Phase II Group Sessions: 1    Total # of Phase I Group Sessions:  Orientation on 11/22/17 + 21 sessions of Phase I     MONDAY TUESDAY WEDNESDAY THURSDAY FRIDAY SATURDAY SUNDAY Total   Group Therapy  2hrs       2hrs   Specialty Groups*           1:1           Family Program           [place]             Phase II             Absent           Total  2hrs      2hrs     *Specialty Groups include Mental Health Care, Assertiveness and Communication, Sobriety Maintenance Skills, Spiritual Care, Stress Management, Relapse Prevention, Family Systems.                    Learning Style:  Visual    Staff member contributing:  Marely Wilkerson MS, LADC, LPCC and Ruperto Reeves, Intern    Received supervision:  No    Client:  contributed to goals and plan    Did Client receive a copy of treatment plan/revised plan:  Yes    Changes to Treatment Plan:      Client agrees with plan/revised plan:  Yes    1) Care Coordination Activities:  no  2) Medical, Mental Health and other appointments the client attended: none  3) Medication issues: no change  4) Physical and mental health problems: See dimension 2  5) Review and evaluation of the individual abuse prevention plan: NA    Any changes in Vulnerable Adult Status:  No    Substance Use Disorders:  None and Alcohol Use Disorder Mild (F10.10)      ASAM Risk Ratings and Data       DIMENSION 1: Acute Intoxication/Withdrawal  The client's ability to cope with  "withdrawal symptoms and current state of intoxication       Acute Intoxication/Withdrawal - Current Risk Factor:  0    Reporting sober date of 9/21/17    Goals:  Client will develop effective strategies to maintain abstinence.    Data:  Client did not demonstrate or report any signs/symptoms of intoxication or withdrawal.  Client confirmed sobriety date.      DIMENSION 2:  Biomedical Conditions and Complaints  The degree to which any physical disorder would interfere with treatment for substance abuse and the client's ability to tolerate any related discomfort     Biomedical Conditions and Complaints - Current Risk Factor:  0    Goals: Client will continue to effectively manage her overall physical health    Data:   Client does report having a PCP.  Client rated her overall health at a 10, with 10 being the highest, on her check-in sheet from 2/20/18.  She endorses eating well, exercise, and going to bed relatively early for self-care; she reported \"I've been going to the gym everyday\" as any changes to her physical health this week.       DIMENSION 3:  Emotional/Behavioral/Cognitive Conditions and Complications  The degree to which any condition or complications are likely to interfere with treatment for substance abuse or with function in significant life areas and the likelihood of risk of harm to self or others.     Emotional/Behavioral - Current Risk Factor:  1    DSM-5 Diagnoses:   Client completed a Diagnostic Assessment with this  on 12/27/17.  No mental health diagnoses were evident.  Client reports no past or current mental health concerns.      Suicide Assessment:  Risk Status    Ideation - Active thoughts of suicide Intent to follow through on suicide Plan for completing suicide    Yes No Yes No Yes No   Emergent  X  X  X   Urgent / Non-Emergent  X  X  X   Non- Urgent  X  X  X   No Current/Active Risk   X  X  X     Goals: Client will gain insight regarding her interest in various career fields " "and gain awareness regarding her identity to focus on what in particular she wants to improve in herself.     Data:  Client denied any past or current SI and SIB.      Client noted on her check-in sheet from 2/20/18 that she has been feeling \"relieved and happy\".  She denied experiencing any mental health symptoms.     Client has the following protective factors: supportive family and boyfriend, employed, has her license in [email or web address]. Client has the following risk factors: legal issues, and reported that she is socially influenced when it comes to using alcohol, and has used it in the past to enjoy herself.       DIMENSION 4:  Readiness to Change  Consider the amount of support and encouragement necessary to keep the client involved in treatment.     Readiness to Change - Current Risk Factor:  1    Goals:  Client will verbalize the benefits of this outpatient treatment program.               Client will gain insight regarding her motivation towards the changes she wants to achieve, and develop               realistic goals to achieve the financial changes she wants to make in her life.     Data: Client reported on her check-in sheet from 2/20/18 that her main motivation for sobriety is \"my well being\"; she rated her motivation level at a 10 (with 10 being the highest).  She continues to note that \"nothing\" currently blocks her motivation for sobriety.      Client's risk rating has changed from a 0 to a 1.  Client was placed on a treatment contract on 2/6/18 due to receiving two unexcused absences over the past week.  While client did struggle in taking responsibility for missing group, she has been an active group participant during Phase I and admits to having a problem with alcohol use.  Therefore, client will transition to Phase II beginning on 2/20/18.  During client's last Phase I group session on 2/6/18, she stated that spending time with friends and scheduling exercise time throughout the week will help her " "maintain consistent sober structure.       DIMENSION 5:  Relapse/Continued Use/Continued Problem Potential  Consider the degree to which the client recognizes relapse issues and has the skills to prevent relapse of either substance use or mental health problems.     Relapse/Continued Use/Continued Problem Potential - Current Risk Factor:  2    Goals:  Client will gain insight regarding relapse prevention skills and will demonstrate these skills and how she can apply them in social situation.    Data:  Client rated her cravings over the last week at a level 2, with 10 being the highest.  She noted that \"knowing how negative alcohol is for my personal life\" helps decrease her cravings. She noted \"a friend offered to get drinks and I said no\" regarding her biggest trigger this past week. Client reported that staying away from the small things that lead to drinking is helpful for her.     DIMENSION 6:  Recovery Environment  Consider the degree to which key areas of the client's life are supportive of or antagonistic to treatment participation and recovery.     Recovery Environment - Current Risk Factor:  2    Support group attended this week:  No    Did family agree to attend family week:  No    If yes:  none schedule this week    Goals:  Client will follow all directives from her , once she acquires a specific  and will expand her sober support network.     Data:  Client reported that she did not attend a sober support group meeting this past week due to \"I just haven't found one\".  Client rated her living situation a 1 out of 10, with 10 being the most stressful on her check-in sheet from 2/20/18, as she noted a \"very supportive roommate\".  She noted the following sober activities from the past week: the gym, going out to dinner, got coffee with a friend.         D: Sober Support Activity     Intervention: Clients were asked to identify important sober support systems as protective " barriers between themselves and their drug of choice in an interactive activity    Assessment:  Stages of Change Model  Contemplation   Client provided some ideas for the activity, specifically listing sober structure and sober fun activities.  As client was minimally participative in the activity, it may demonstrate that she lacks ideas and that she is not certain which protective barriers may be most beneficial for her.      Plan:  Attend Phase II group therapy on 2/27/18.             Continue to go to the gym             Explore additional sober supports

## 2018-02-27 ENCOUNTER — HOSPITAL ENCOUNTER (OUTPATIENT)
Dept: BEHAVIORAL HEALTH | Facility: CLINIC | Age: 23
End: 2018-02-27
Attending: SOCIAL WORKER
Payer: COMMERCIAL

## 2018-02-27 PROCEDURE — H2035 A/D TX PROGRAM, PER HOUR: HCPCS | Mod: HQ

## 2018-02-28 NOTE — PROGRESS NOTES
CD ADULT Progress Note     Treatment Plan Review completed on: 2/28/2018    Client turned her safety plan in on 12/13/17 (later than expected) as she was unsure how to answer some of the questions.  Therefore, client's Acknowledgement of Current Treatment Plan was signed by client and counseling intern on 12/13/17.      Phase II Attendance Dates: 2/27/18  Total # of Phase II Group Sessions: 2    Total # of Phase I Group Sessions:  Orientation on 11/22/17 + 21 sessions of Phase I     MONDAY TUESDAY WEDNESDAY THURSDAY FRIDAY SATURDAY SUNDAY Total   Group Therapy  1.5 hrs       1.5 hrs   Specialty Groups*           1:1           Family Program           [place]             Phase II             Absent           Total  1.5 hrs      1.5 hrs     *Specialty Groups include Mental Health Care, Assertiveness and Communication, Sobriety Maintenance Skills, Spiritual Care, Stress Management, Relapse Prevention, Family Systems.                    Learning Style:  Visual    Staff member contributing:  Marely Wilkerson, MS, LADC, LPCC and Ruperto Reeves, Intern    Received supervision:  No    Client:  contributed to goals and plan    Did Client receive a copy of treatment plan/revised plan:  Yes    Changes to Treatment Plan:      Client agrees with plan/revised plan:  Yes    1) Care Coordination Activities:  no  2) Medical, Mental Health and other appointments the client attended: none  3) Medication issues: no change  4) Physical and mental health problems: See dimension 2  5) Review and evaluation of the individual abuse prevention plan: NA    Any changes in Vulnerable Adult Status:  No    Substance Use Disorders:  None and Alcohol Use Disorder Mild (F10.10)      ASAM Risk Ratings and Data       DIMENSION 1: Acute Intoxication/Withdrawal  The client's ability to cope with withdrawal symptoms and current state of intoxication       Acute Intoxication/Withdrawal - Current Risk Factor:  0    Reporting sober date of  9/21/17    Goals:  Client will develop effective strategies to maintain abstinence.    Data:  Client did not demonstrate or report any signs/symptoms of intoxication or withdrawal.  Client confirmed sobriety date.      DIMENSION 2:  Biomedical Conditions and Complaints  The degree to which any physical disorder would interfere with treatment for substance abuse and the client's ability to tolerate any related discomfort     Biomedical Conditions and Complaints - Current Risk Factor:  0    Goals: Client will continue to effectively manage her overall physical health    Data:   Client does report having a PCP.  Client rated her overall health at a 9, with 10 being the highest, on her check-in sheet from 2/27/18.  She endorses exercising and eating well; she noted no changes to her physical health this week.       DIMENSION 3:  Emotional/Behavioral/Cognitive Conditions and Complications  The degree to which any condition or complications are likely to interfere with treatment for substance abuse or with function in significant life areas and the likelihood of risk of harm to self or others.     Emotional/Behavioral - Current Risk Factor:  1    DSM-5 Diagnoses:   Client completed a Diagnostic Assessment with this  on 12/27/17.  No mental health diagnoses were evident.  Client reports no past or current mental health concerns.      Suicide Assessment:  Risk Status    Ideation - Active thoughts of suicide Intent to follow through on suicide Plan for completing suicide    Yes No Yes No Yes No   Emergent  X  X  X   Urgent / Non-Emergent  X  X  X   Non- Urgent  X  X  X   No Current/Active Risk   X  X  X     Goals: Client will gain insight regarding her interest in various career fields and gain awareness regarding her identity to focus on what in particular she wants to improve in herself.     Data:  Client denied any past or current SI and SIB.      Client noted on her check-in sheet from 2/27/18 that she has been  "feeling \"rested and calm\".  She denied experiencing any mental health symptoms.     Client has the following protective factors: supportive family and boyfriend, employed, [place] license in [place]. Client has the following risk factors: legal issues, lack of social sober support, and she reported that she is socially influenced when it comes to using alcohol, and has used it in the past to enjoy herself.       DIMENSION 4:  Readiness to Change  Consider the amount of support and encouragement necessary to keep the client involved in treatment.     Readiness to Change - Current Risk Factor:  1    Goals:  Client will verbalize the benefits of this outpatient treatment program.               Client will gain insight regarding her motivation towards the changes she wants to achieve, and develop               realistic goals to achieve the financial changes she wants to make in her life.     Data: Client reported on her check-in sheet from 2/27/18 that her main motivation for sobriety is \"my health\"; she rated her motivation level at a 10 (with 10 being the highest).  She continues to note that \"nothing\" currently blocks her motivation for sobriety.      Client's risk rating has changed from a 0 to a 1.  Client was placed on a treatment contract on 2/6/18 due to receiving two unexcused absences over the past week.  While client did struggle in taking responsibility for missing group, she has been an active group participant during Phase I and admits to having a problem with alcohol use.  Therefore, client will transition to Phase II beginning on 2/20/18.  During client's last Phase I group session on 2/6/18, she stated that spending time with friends and scheduling exercise time throughout the week will help her maintain consistent sober structure.       DIMENSION 5:  Relapse/Continued Use/Continued Problem Potential  Consider the degree to which the client recognizes relapse issues and has the skills to prevent " "relapse of either substance use or mental health problems.     Relapse/Continued Use/Continued Problem Potential - Current Risk Factor:  2    Goals:  Client will gain insight regarding relapse prevention skills and will demonstrate these skills and how she can apply them in social situation.    Data:  Client rated her cravings over the last week at a level 2, with 10 being the highest.  She noted that \"keeping active\" helps decrease her cravings. She noted \"going out to eat\" as her biggest trigger this past week. Client has reported that staying away from the small things that lead to drinking is helpful for her.     DIMENSION 6:  Recovery Environment  Consider the degree to which key areas of the client's life are supportive of or antagonistic to treatment participation and recovery.     Recovery Environment - Current Risk Factor:  2    Support group attended this week:  No    Did family agree to attend family week:  No    If yes:  none schedule this week    Goals:  Client will follow all directives from her , once she acquires a specific  and will expand her sober support network.     Data:  Client reported that she did not attend a sober support group meeting this past week due to \"I don't want to\".  Client rated her living situation a 5 out of 10, with 10 being the most stressful on her check-in sheet from 2/20/18, as she noted that she is \"tired of cleaning up after someone\".  She noted the following sober activities from the past week: gym, shopping, and bowling.         D: Discussion of client's recent SMART goal she identified the end of Phase I      Intervention: Client was asked about her progress in meeting with friends and family on a weekly basis to remain socially connected and to have fun.      Assessment:  Stages of Change Model  Contemplation   Client stated that she has met with a friend at least once weekly for a meal and that she has been spending time " individually with each of her parents, which she has found to be very helpful and supportive.  Client has followed through on an important goal she set for herself, and she appears to see the benefits of maintaining this goal.  Client has not yet followed through on her goal of attending one AA meeting.      Plan:  Attend Phase II group therapy on 3/6/18.             Continue to go to the gym             Explore additional sober supports

## 2018-02-28 NOTE — PROGRESS NOTES
Case consultation:  D)  Therapist reviewed case for status.  DOC alcohol.  No MH dx.  Ct has not attended sober support meetings but is increasing her socializing.  Stable.  P)  Continue to work on recovery care plan.    SOPHY Angel, LICSW, [facility]  Clinical

## 2018-03-06 ENCOUNTER — HOSPITAL ENCOUNTER (OUTPATIENT)
Dept: BEHAVIORAL HEALTH | Facility: CLINIC | Age: 23
End: 2018-03-06
Attending: SOCIAL WORKER
Payer: COMMERCIAL

## 2018-03-06 PROCEDURE — H2035 A/D TX PROGRAM, PER HOUR: HCPCS | Mod: HQ

## 2018-03-07 NOTE — PROGRESS NOTES
CD ADULT Progress Note     Treatment Plan Review completed on: 3/7/2018    Client turned her safety plan in on 12/13/17 (later than expected) as she was unsure how to answer some of the questions.  Therefore, client's Acknowledgement of Current Treatment Plan was signed by client and counseling intern on 12/13/17.      Phase II Attendance Dates: 3/6/18  Total # of Phase II Group Sessions: 3    Total # of Phase I Group Sessions:  Orientation on 11/22/17 + 21 sessions of Phase I     MONDAY TUESDAY WEDNESDAY THURSDAY FRIDAY SATURDAY SUNDAY Total   Group Therapy  1.5 hrs       1.5 hrs   Specialty Groups*           1:1           Family Program           [place]             Phase II             Absent           Total  1.5 hrs      1.5 hrs     *Specialty Groups include Mental Health Care, Assertiveness and Communication, Sobriety Maintenance Skills, Spiritual Care, Stress Management, Relapse Prevention, Family Systems.                    Learning Style:  Visual    Staff member contributing:  Marely Wilkerson, MS, LADC, LPCC and Ruperto Reeves, Intern    Received supervision:  No    Client:  contributed to goals and plan    Did Client receive a copy of treatment plan/revised plan:  Yes    Changes to Treatment Plan:      Client agrees with plan/revised plan:  Yes    1) Care Coordination Activities:  no  2) Medical, Mental Health and other appointments the client attended: none  3) Medication issues: no change  4) Physical and mental health problems: See dimension 2  5) Review and evaluation of the individual abuse prevention plan: NA    Any changes in Vulnerable Adult Status:  No    Substance Use Disorders:  None and Alcohol Use Disorder Mild (F10.10)      ASAM Risk Ratings and Data       DIMENSION 1: Acute Intoxication/Withdrawal  The client's ability to cope with withdrawal symptoms and current state of intoxication       Acute Intoxication/Withdrawal - Current Risk Factor:  0    Reporting sober date of  9/21/17    Goals:  Client will develop effective strategies to maintain abstinence.    Data:  Client did not demonstrate or report any signs/symptoms of intoxication or withdrawal.  Client confirmed sobriety date.      DIMENSION 2:  Biomedical Conditions and Complaints  The degree to which any physical disorder would interfere with treatment for substance abuse and the client's ability to tolerate any related discomfort     Biomedical Conditions and Complaints - Current Risk Factor:  0    Goals: Client will continue to effectively manage her overall physical health    Data:   Client does report having a PCP.  Client rated her overall health at a 9, with 10 being the highest, on her check-in sheet from 3/6/18.  She endorses exercising and eating well; she noted no changes to her physical health this week.       DIMENSION 3:  Emotional/Behavioral/Cognitive Conditions and Complications  The degree to which any condition or complications are likely to interfere with treatment for substance abuse or with function in significant life areas and the likelihood of risk of harm to self or others.     Emotional/Behavioral - Current Risk Factor:  1    DSM-5 Diagnoses:   Client completed a Diagnostic Assessment with this  on 12/27/17.  No mental health diagnoses were evident.  Client reports no past or current mental health concerns.      Suicide Assessment:  Risk Status    Ideation - Active thoughts of suicide Intent to follow through on suicide Plan for completing suicide    Yes No Yes No Yes No   Emergent  X  X  X   Urgent / Non-Emergent  X  X  X   Non- Urgent  X  X  X   No Current/Active Risk   X  X  X     Goals: Client will gain insight regarding her interest in various career fields and gain awareness regarding her identity to focus on what in particular she wants to improve in herself.     Data:  Client denied any past or current SI and SIB.      Client noted on her check-in sheet from 3/6/18 that she has been  "feeling \"rushed-anxious\" as Tuesdays are her \"Monday\" as she does not work on Mondays.  She denied experiencing any mental health symptoms of concern.     Client has the following protective factors: supportive family and boyfriend, employed, [place] in [place]. Client has the following risk factors: legal issues, lack of social sober support, and she reported that she is socially influenced when it comes to using alcohol, and has used it in the past to enjoy herself.       DIMENSION 4:  Readiness to Change  Consider the amount of support and encouragement necessary to keep the client involved in treatment.     Readiness to Change - Current Risk Factor:  1    Goals:  Client will verbalize the benefits of this outpatient treatment program.               Client will gain insight regarding her motivation towards the changes she wants to achieve, and develop               realistic goals to achieve the financial changes she wants to make in her life.     Data: Client reported on her check-in sheet from 3/6/18 that her main motivation for sobriety is \"my health\"; she rated her motivation level at a 8 (with 10 being the highest).  She continues to note that \"nothing\" currently blocks her motivation for sobriety.      Client's risk rating has changed from a 0 to a 1.  Client was placed on a treatment contract on 2/6/18 due to receiving two unexcused absences over the past week.  While client did struggle in taking responsibility for missing group, she has been an active group participant during Phase I and admits to having a problem with alcohol use.  Therefore, client will transition to Phase II beginning on 2/20/18.  During client's last Phase I group session on 2/6/18, she stated that spending time with friends and scheduling exercise time throughout the week will help her maintain consistent sober structure.      Client does report increased structure in her daily life by spending time with friends.     " "  DIMENSION 5:  Relapse/Continued Use/Continued Problem Potential  Consider the degree to which the client recognizes relapse issues and has the skills to prevent relapse of either substance use or mental health problems.     Relapse/Continued Use/Continued Problem Potential - Current Risk Factor:  2    Goals:  Client will gain insight regarding relapse prevention skills and will demonstrate these skills and how she can apply them in social situation.    Data:  Client rated her cravings over the last week at a level 2, with 10 being the highest.  She noted that \"keeping busy\" helps decrease her cravings. She noted \"honestly, nothing\" as her biggest trigger this past week. Client has reported that staying away from the small things that lead to drinking is helpful for her.     DIMENSION 6:  Recovery Environment  Consider the degree to which key areas of the client's life are supportive of or antagonistic to treatment participation and recovery.     Recovery Environment - Current Risk Factor:  2    Support group attended this week:  No    Did family agree to attend family week:  No    If yes:  none schedule this week    Goals:  Client will follow all directives from her , once she acquires a specific  and will expand her sober support network.     Data:  Client reported that she did not attend a sober support group meeting this past week as \"I'm finding my own ways to stay sober\".  Client rated her living situation a 3 out of 10, with 10 being the most stressful on her check-in sheet from 3/6/18, as she noted that \"I wish my roommate would clean more\".  She noted the following sober activities from the past week: work, bowling, lunch/coffee with friends.         D: Anita stages of grief     Intervention: Client was asked how she has experienced Anita' stages of grief in her recovery.       Assessment:  Stages of Change Model  Contemplation   Client stated that she " "frequently found herself in the \"anger\" stage while on house arrest, often questioning \"why me?\".  She expressed feeling more accepting now that she is finished with her house arrest; however, sometimes it appears that client may be experiencing some denial in her recovery as she lacks personal initiative in group participation and as she has not followed through on her plan to try out an AA meeting.      Plan:  Attend Phase II group therapy on 3/13/18.             Continue to go to the gym and spend time with supportive friends             Explore additional sober supports                "

## 2018-03-13 ENCOUNTER — HOSPITAL ENCOUNTER (OUTPATIENT)
Dept: BEHAVIORAL HEALTH | Facility: CLINIC | Age: 23
End: 2018-03-13
Attending: SOCIAL WORKER
Payer: COMMERCIAL

## 2018-03-13 PROCEDURE — H2035 A/D TX PROGRAM, PER HOUR: HCPCS | Mod: HQ

## 2018-03-14 NOTE — PROGRESS NOTES
CD ADULT Progress Note     Treatment Plan Review completed on: 3/13/2018    Client turned her safety plan in on 12/13/17 (later than expected) as she was unsure how to answer some of the questions.  Therefore, client's Acknowledgement of Current Treatment Plan was signed by client and counseling intern on 12/13/17.      Phase II Attendance Dates: 3/13/18  Total # of Phase II Group Sessions: 4    Total # of Phase I Group Sessions:  Orientation on 11/22/17 + 21 sessions of Phase I     MONDAY TUESDAY WEDNESDAY THURSDAY FRIDAY SATURDAY SUNDAY Total   Group Therapy  1.5 hrs       1.5 hrs   Specialty Groups*           1:1           Family Program           [place]             Phase II             Absent           Total  1.5 hrs      1.5 hrs     *Specialty Groups include Mental Health Care, Assertiveness and Communication, Sobriety Maintenance Skills, Spiritual Care, Stress Management, Relapse Prevention, Family Systems.                    Learning Style:  Visual    Staff member contributing:  Marely Wilkerson, MS, LADC, LPCC and Ruperto Reeves, Intern    Received supervision:  No    Client:  contributed to goals and plan    Did Client receive a copy of treatment plan/revised plan:  Yes    Changes to Treatment Plan:      Client agrees with plan/revised plan:  Yes    1) Care Coordination Activities:  no  2) Medical, Mental Health and other appointments the client attended: none  3) Medication issues: no change  4) Physical and mental health problems: See dimension 2  5) Review and evaluation of the individual abuse prevention plan: NA    Any changes in Vulnerable Adult Status:  No    Substance Use Disorders:  None and Alcohol Use Disorder Mild (F10.10)      ASAM Risk Ratings and Data       DIMENSION 1: Acute Intoxication/Withdrawal  The client's ability to cope with withdrawal symptoms and current state of intoxication       Acute Intoxication/Withdrawal - Current Risk Factor:  0    Reporting sober date of  9/21/17    Goals:  Client will develop effective strategies to maintain abstinence.    Data:  Client did not demonstrate or report any signs/symptoms of intoxication or withdrawal.  Client confirmed sobriety date.      DIMENSION 2:  Biomedical Conditions and Complaints  The degree to which any physical disorder would interfere with treatment for substance abuse and the client's ability to tolerate any related discomfort     Biomedical Conditions and Complaints - Current Risk Factor:  0    Goals: Client will continue to effectively manage her overall physical health    Data:   Client does report having a PCP.  Client rated her overall health at a 9, with 10 being the highest, on her check-in sheet from 3/13/18.  She endorses exercising and eating well; she noted no changes to her physical health this week.       DIMENSION 3:  Emotional/Behavioral/Cognitive Conditions and Complications  The degree to which any condition or complications are likely to interfere with treatment for substance abuse or with function in significant life areas and the likelihood of risk of harm to self or others.     Emotional/Behavioral - Current Risk Factor:  1    DSM-5 Diagnoses:   Client completed a Diagnostic Assessment with this  on 12/27/17.  No mental health diagnoses were evident.  Client reports no past or current mental health concerns.      Suicide Assessment:  Risk Status    Ideation - Active thoughts of suicide Intent to follow through on suicide Plan for completing suicide    Yes No Yes No Yes No   Emergent  X  X  X   Urgent / Non-Emergent  X  X  X   Non- Urgent  X  X  X   No Current/Active Risk   X  X  X     Goals: Client will gain insight regarding her interest in various career fields and gain awareness regarding her identity to focus on what in particular she wants to improve in herself.     Data:  Client denied any past or current SI and SIB.      Client noted on her check-in sheet from 3/13/18 that she has been  "feeling \"rested, calm\" as she has had time to catch up on daily tasks.  She denied experiencing any mental health symptoms.     Client has the following protective factors: supportive family and boyfriend, employed, [place] in [place]. Client has the following risk factors: legal issues, lack of social sober support, and she reported that she is socially influenced when it comes to using alcohol, and has used it in the past to enjoy herself.       DIMENSION 4:  Readiness to Change  Consider the amount of support and encouragement necessary to keep the client involved in treatment.     Readiness to Change - Current Risk Factor:  1    Goals:  Client will verbalize the benefits of this outpatient treatment program.               Client will gain insight regarding her motivation towards the changes she wants to achieve, and develop               realistic goals to achieve the financial changes she wants to make in her life.     Data: Client reported on her check-in sheet from 3/13/18 that her main motivation for sobriety is \"my health and relationships\"; she rated her motivation level at a 10 (with 10 being the highest).  She continues to note that \"nothing\" currently blocks her motivation for sobriety.      Client's risk rating has changed from a 0 to a 1.  Client was placed on a treatment contract on 2/6/18 due to receiving two unexcused absences over the past week.  While client did struggle in taking responsibility for missing group, she has been an active group participant during Phase I and admits to having a problem with alcohol use.  Therefore, client will transition to Phase II beginning on 2/20/18.  During client's last Phase I group session on 2/6/18, she stated that spending time with friends and scheduling exercise time throughout the week will help her maintain consistent sober structure.      Client does report increased structure in her daily life by spending time with friends.     " "  DIMENSION 5:  Relapse/Continued Use/Continued Problem Potential  Consider the degree to which the client recognizes relapse issues and has the skills to prevent relapse of either substance use or mental health problems.     Relapse/Continued Use/Continued Problem Potential - Current Risk Factor:  2    Goals:  Client will gain insight regarding relapse prevention skills and will demonstrate these skills and how she can apply them in social situation.    Data:  Client rated her cravings over the last week at a level 2, with 10 being the highest.  She noted that \"not going out to bars late\" helps decrease her cravings. She noted \"I didn't really have any\" as her biggest trigger this past week. Client has reported that staying away from the small things that lead to drinking is helpful for her.  Client also stated that she \"got lunch with another friend\" as a way to expand her sober support network this past week.     DIMENSION 6:  Recovery Environment  Consider the degree to which key areas of the client's life are supportive of or antagonistic to treatment participation and recovery.     Recovery Environment - Current Risk Factor:  2    Support group attended this week:  No    Did family agree to attend family week:  No    If yes:  none schedule this week    Goals:  Client will follow all directives from her , once she acquires a specific  and will expand her sober support network.     Data:  Client reported that she did not attend a sober support group meeting this past week as \"I don't want to/feel the need to\".  Client rated her living situation a 2 out of 10, with 10 being the most stressful on her check-in sheet from 3/13/18, as she noted \"very calm living environment\".  She noted the following sober activities from the past week: gym, working, shopping.         D: Treatment plan assignment- Strengths Finder (Five strengths she holds)     Intervention: Client was asked how she " is using her strengths.      Assessment:  Stages of Change Model  Contemplation   Client stated that positivity, routine, and finding things of significance are some of her strengths.  She also stated that establishing a routine in her life has been very important to her recovery and prior to treatment, she was struggling to maintain routine.  Client also mentioned that when she is not able to maintain her routine/when things don't happen as planned, this becomes a stressor for her.  Client appears to have some awareness of herself and it seems that she struggles at times to remain flexible and positive in the midst of challenging life circumstances.  Therefore, continued development of effective coping tools will be important.      Plan:  Attend Phase II group therapy on 3/20/18.             Continue to go to the gym and spend time with supportive friends             Explore additional sober supports

## 2018-03-20 ENCOUNTER — HOSPITAL ENCOUNTER (OUTPATIENT)
Dept: BEHAVIORAL HEALTH | Facility: CLINIC | Age: 23
End: 2018-03-20
Attending: SOCIAL WORKER
Payer: COMMERCIAL

## 2018-03-20 PROCEDURE — H2035 A/D TX PROGRAM, PER HOUR: HCPCS | Mod: HQ

## 2018-03-21 NOTE — PROGRESS NOTES
CD ADULT Progress Note     Treatment Plan Review completed on: 3/21/2018    Client turned her safety plan in on 12/13/17 (later than expected) as she was unsure how to answer some of the questions.  Therefore, client's Acknowledgement of Current Treatment Plan was signed by client and counseling intern on 12/13/17.      Phase II Attendance Dates: 3/20/18  Total # of Phase II Group Sessions: 5    Total # of Phase I Group Sessions:  Orientation on 11/22/17 + 21 sessions of Phase I     MONDAY TUESDAY WEDNESDAY THURSDAY FRIDAY SATURDAY SUNDAY Total   Group Therapy  1.5 hrs       1.5 hrs   Specialty Groups*           1:1           Family Program           [place]             Phase II             Absent           Total  1.5 hrs      1.5 hrs     *Specialty Groups include Mental Health Care, Assertiveness and Communication, Sobriety Maintenance Skills, Spiritual Care, Stress Management, Relapse Prevention, Family Systems.                    Learning Style:  Visual    Staff member contributing:  Marely Wilkerson, MS, LADC, LPCC and Ruperto Reeves, Intern    Received supervision:  No    Client:  contributed to goals and plan    Did Client receive a copy of treatment plan/revised plan:  Yes    Changes to Treatment Plan:      Client agrees with plan/revised plan:  Yes    1) Care Coordination Activities:  no  2) Medical, Mental Health and other appointments the client attended: none  3) Medication issues: no change  4) Physical and mental health problems: See dimension 2  5) Review and evaluation of the individual abuse prevention plan: NA    Any changes in Vulnerable Adult Status:  No    Substance Use Disorders:  None and Alcohol Use Disorder Mild (F10.10)      ASAM Risk Ratings and Data       DIMENSION 1: Acute Intoxication/Withdrawal  The client's ability to cope with withdrawal symptoms and current state of intoxication       Acute Intoxication/Withdrawal - Current Risk Factor:  0    Reporting sober date of  9/21/17    Goals:  Client will develop effective strategies to maintain abstinence.    Data:  Client did not demonstrate or report any signs/symptoms of intoxication or withdrawal.  Client confirmed sobriety date.      DIMENSION 2:  Biomedical Conditions and Complaints  The degree to which any physical disorder would interfere with treatment for substance abuse and the client's ability to tolerate any related discomfort     Biomedical Conditions and Complaints - Current Risk Factor:  0    Goals: Client will continue to effectively manage her overall physical health    Data:   Client does report having a PCP.  Client rated her overall health at a 9, with 10 being the highest, on her check-in sheet from 3/20/18.  She endorses exercising, eating, and sleeping for self-care; she noted no changes to her physical health this week.       DIMENSION 3:  Emotional/Behavioral/Cognitive Conditions and Complications  The degree to which any condition or complications are likely to interfere with treatment for substance abuse or with function in significant life areas and the likelihood of risk of harm to self or others.     Emotional/Behavioral - Current Risk Factor:  1    DSM-5 Diagnoses:   Client completed a Diagnostic Assessment with this  on 12/27/17.  No mental health diagnoses were evident.  Client reports no past or current mental health concerns.      Suicide Assessment:  Risk Status    Ideation - Active thoughts of suicide Intent to follow through on suicide Plan for completing suicide    Yes No Yes No Yes No   Emergent  X  X  X   Urgent / Non-Emergent  X  X  X   Non- Urgent  X  X  X   No Current/Active Risk   X  X  X     Goals: Client will gain insight regarding her interest in various career fields and gain awareness regarding her identity to focus on what in particular she wants to improve in herself.     Data:  Client denied any past or current SI and SIB.      Client noted on her check-in sheet from 3/20/18  "that she has been feeling \"excited and motivated\" as her boyfriend will be home visiting this week from [place] and as she will be interviewing for another part time job at a [employer] in [place].  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  She denied experiencing any mental health symptoms.     Client has the following protective factors: supportive family and boyfriend, employed, [place] [employer]. Client has the following risk factors: legal issues, lack of social sober support, and she reported that she is socially influenced when it comes to using alcohol, and has used it in the past to enjoy herself.       DIMENSION 4:  Readiness to Change  Consider the amount of support and encouragement necessary to keep the client involved in treatment.     Readiness to Change - Current Risk Factor:  1    Goals:  Client will verbalize the benefits of this outpatient treatment program.               Client will gain insight regarding her motivation towards the changes she wants to achieve, and develop               realistic goals to achieve the financial changes she wants to make in her life.     Data: Client reported on her check-in sheet from 3/20/18 that her main motivation for sobriety is \"my relationships\"; she rated her motivation level at a 8 (with 10 being the highest).  She noted that \"alcohol\" currently blocks her motivation for sobriety.  Client also reported in group that she doesn't believe that she had true relationships before, as her previous relationships revolved around the use of alcohol.  Client stated that she finds that she expresses her feelings and who she is much more easily now that she has been sober.       2/6/18: Client's risk rating has changed from a 0 to a 1.  Client was placed on a treatment contract on 2/6/18 due to receiving two unexcused absences over the past week.  While client did struggle in taking responsibility for " "missing group, she has been an active group participant during Phase I and admits to having a problem with alcohol use. Therefore, client will transition to Phase II beginning on 2/20/18.  During client's last Phase I group session on 2/6/18, she stated that spending time with friends and scheduling exercise time throughout the week will help her maintain consistent sober structure.      Client does report increased structure in her daily life by spending time with friends.       DIMENSION 5:  Relapse/Continued Use/Continued Problem Potential  Consider the degree to which the client recognizes relapse issues and has the skills to prevent relapse of either substance use or mental health problems.     Relapse/Continued Use/Continued Problem Potential - Current Risk Factor:  2    Goals:  Client will gain insight regarding relapse prevention skills and will demonstrate these skills and how she can apply them in social situation.    Data:  3/20/18- Client rated her cravings over the last week at a level 2, with 10 being the highest.  She noted that \"not going to bars\" helps decrease her cravings. She noted \"sitting at the bar alone\" as her biggest trigger this past week. Client has reported that staying away from the small things that lead to drinking is helpful for her and that having an interlock device on her car until September 2018 is a significant deterrent from drinking.  Client also stated that she \"got lunch with another friend\" as a way to expand her sober support network this past week.     DIMENSION 6:  Recovery Environment  Consider the degree to which key areas of the client's life are supportive of or antagonistic to treatment participation and recovery.     Recovery Environment - Current Risk Factor:  2    Support group attended this week:  No    Did family agree to attend family week:  No    If yes:  none schedule this week    Goals:  Client will follow all directives from her , once she " "acquires a specific  and will expand her sober support network.     Data:  Client reported that she did not attend a sober support group meeting this past week as \"I don't want to\".  Client rated her living situation a 2 out of 10, with 10 being the most stressful on her check-in sheet from 3/13/18, as she noted \"my roommate, for the most part, is very respectufl\".  She noted the following sober activities from the past week: gym, going for a walk, planning a trip with my mom.         D: Check-in      Intervention: What success/challenges did you experience this past week?      Assessment:  Stages of Change- Preparation  Client stated that she talked to the manager of a [employer] in [place] about possible job openings and that she has an interview scheduled in a few weeks.  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  Client appears to be searching for new experiences and for ways to enrich her life.        Plan:  Attend Phase II group therapy on 3/27/18.             Continue to go to the gym and spend time with supportive friends             Interview for [facility] in a few weeks             Explore additional sober supports                "

## 2018-03-27 ENCOUNTER — HOSPITAL ENCOUNTER (OUTPATIENT)
Dept: BEHAVIORAL HEALTH | Facility: CLINIC | Age: 23
End: 2018-03-27
Attending: SOCIAL WORKER
Payer: COMMERCIAL

## 2018-03-27 PROCEDURE — H2035 A/D TX PROGRAM, PER HOUR: HCPCS | Mod: HQ

## 2018-03-27 NOTE — PROGRESS NOTES
CD ADULT Progress Note     Treatment Plan Review completed on: 3/27/2018    Client turned her safety plan in on 12/13/17 (later than expected) as she was unsure how to answer some of the questions.  Therefore, client's Acknowledgement of Current Treatment Plan was signed by client and counseling intern on 12/13/17.      Phase II Attendance Dates: 3/27/18  Total # of Phase II Group Sessions: 6    Total # of Phase I Group Sessions:  Orientation on 11/22/17 + 21 sessions of Phase I     MONDAY TUESDAY WEDNESDAY THURSDAY FRIDAY SATURDAY SUNDAY Total   Group Therapy  1.5 hrs       1.5 hrs   Specialty Groups*           1:1           Family Program           [place]             Phase II             Absent           Total  1.5 hrs      1.5 hrs     *Specialty Groups include Mental Health Care, Assertiveness and Communication, Sobriety Maintenance Skills, Spiritual Care, Stress Management, Relapse Prevention, Family Systems.                    Learning Style:  Visual    Staff member contributing:  Marely Wilkerson, MS, LADC, LPCC and Ruperto Reeves, Intern    Received supervision:  No    Client:  contributed to goals and plan    Did Client receive a copy of treatment plan/revised plan:  Yes    Changes to Treatment Plan:      Client agrees with plan/revised plan:  Yes    1) Care Coordination Activities:  no  2) Medical, Mental Health and other appointments the client attended: none  3) Medication issues: no change  4) Physical and mental health problems: See dimension 2  5) Review and evaluation of the individual abuse prevention plan: NA    Any changes in Vulnerable Adult Status:  No    Substance Use Disorders:  None and Alcohol Use Disorder Mild (F10.10)      ASAM Risk Ratings and Data       DIMENSION 1: Acute Intoxication/Withdrawal  The client's ability to cope with withdrawal symptoms and current state of intoxication       Acute Intoxication/Withdrawal - Current Risk Factor:  0    Reporting sober date of  "9/21/17    Goals:  Client will develop effective strategies to maintain abstinence.    Data:  Client did not demonstrate or report any signs/symptoms of intoxication or withdrawal.  Client confirmed sobriety date.      DIMENSION 2:  Biomedical Conditions and Complaints  The degree to which any physical disorder would interfere with treatment for substance abuse and the client's ability to tolerate any related discomfort     Biomedical Conditions and Complaints - Current Risk Factor:  0    Goals: Client will continue to effectively manage her overall physical health    Data:   Client does report having a PCP.  Client rated her overall health at a 9, with 10 being the highest, on her check-in sheet from 3/27/18.  She endorses exercising, eating, and sleeping for self-care; she noted no changes to her physical health this week.       DIMENSION 3:  Emotional/Behavioral/Cognitive Conditions and Complications  The degree to which any condition or complications are likely to interfere with treatment for substance abuse or with function in significant life areas and the likelihood of risk of harm to self or others.     Emotional/Behavioral - Current Risk Factor:  1    DSM-5 Diagnoses:   Client completed a Diagnostic Assessment with this  on 12/27/17.  No mental health diagnoses were evident.  Client reports no past or current mental health concerns.      Suicide Assessment:  Risk Status    Ideation - Active thoughts of suicide Intent to follow through on suicide Plan for completing suicide    Yes No Yes No Yes No   Emergent  X  X  X   Urgent / Non-Emergent  X  X  X   Non- Urgent  X  X  X   No Current/Active Risk   X  X  X     Goals: Client will gain insight regarding her interest in various career fields and gain awareness regarding her identity to focus on what in particular she wants to improve in herself.     Data:  Client denied any past or current SI and SIB.      3/27/18: Client noted feeling \"strong and " "happy\".  She denied any mental health concerns.  She expressed feeling happy about the rainy weather yesterday, and that she was able to walk in the rain.      3/20/18: Client noted on her check-in sheet from 3/20/18 that she has been feeling \"excited and motivated\" as her boyfriend will be home visiting this week from [place] and as she will be interviewing for another part time job at a [employer] in [place].  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  She denied experiencing any mental health symptoms.     Client has the following protective factors: supportive family and boyfriend, employed, has her license in [employer]. Client has the following risk factors: legal issues, lack of social sober support, and she reported that she is socially influenced when it comes to using alcohol, and has used it in the past to enjoy herself.       DIMENSION 4:  Readiness to Change  Consider the amount of support and encouragement necessary to keep the client involved in treatment.     Readiness to Change - Current Risk Factor:  1    Goals:  Client will verbalize the benefits of this outpatient treatment program.               Client will gain insight regarding her motivation towards the changes she wants to achieve, and develop               realistic goals to achieve the financial changes she wants to make in her life.     3/27/18: Client noted that her main motivation for sobriety is \"maintaining my relationships\" and that \"nothing\" blocks my motivation for sobriety.  Client shared in group that she was handed a mimosa (alcoholic beverage) at a family get-together this past weekend, and that she was able to decline the drink.  Client stated that she did not wish to \"throw away her treatment\" for one drink.      3/20/18: Client reported on her check-in sheet from 3/20/18 that her main motivation for sobriety is \"my relationships\"; she rated her motivation level at a 8 (with " "10 being the highest).  She noted that \"alcohol\" currently blocks her motivation for sobriety.  Client also reported in group that she doesn't believe that she had true relationships before, as her previous relationships revolved around the use of alcohol.  Client stated that she finds that she expresses her feelings and who she is much more easily now that she has been sober.       2/6/18: Client's risk rating has changed from a 0 to a 1.  Client was placed on a treatment contract on 2/6/18 due to receiving two unexcused absences over the past week.  While client did struggle in taking responsibility for missing group, she has been an active group participant during Phase I and admits to having a problem with alcohol use. Therefore, client will transition to Phase II beginning on 2/20/18.  During client's last Phase I group session on 2/6/18, she stated that spending time with friends and scheduling exercise time throughout the week will help her maintain consistent sober structure.      Client does report increased structure in her daily life by spending time with friends.       DIMENSION 5:  Relapse/Continued Use/Continued Problem Potential  Consider the degree to which the client recognizes relapse issues and has the skills to prevent relapse of either substance use or mental health problems.     Relapse/Continued Use/Continued Problem Potential - Current Risk Factor:  2    Goals:  Client will gain insight regarding relapse prevention skills and will demonstrate these skills and how she can apply them in social situation.    Data:  3/27/18: Client rated her cravings over the last week at a level 2, with 10 being the highest.  She noted that \"thinking about my health and saving money\" helps minimize the cravings.  Client listed her biggest trigger this past week as \"being given a mimosa upon arrival at a family function\".  Client stated that having her parents at the event helped provide her with support to not " "drink as well as her thoughts of not wanting to throw away her treatment for one drink.  Client stated that she expanded her sober support network his past week by getting dinner with a friend on Sunday.      3/20/18: Client rated her cravings over the last week at a level 2, with 10 being the highest.  She noted that \"not going to bars\" helps decrease her cravings. She noted \"sitting at the bar alone\" as her biggest trigger this past week. Client has reported that staying away from the small things that lead to drinking is helpful for her and that having an interlock device on her car until September 2018 is a significant deterrent from drinking.  Client also stated that she \"got lunch with another friend\" as a way to expand her sober support network this past week.     DIMENSION 6:  Recovery Environment  Consider the degree to which key areas of the client's life are supportive of or antagonistic to treatment participation and recovery.     Recovery Environment - Current Risk Factor:  2    Support group attended this week:  No    Did family agree to attend family week:  No    If yes:  none schedule this week    Goals:  Client will follow all directives from her , once she acquires a specific  and will expand her sober support network.     Data:  Client reported that she did not attend a sober support group meeting this past week as \"I don't want to go to one.  Not necessary\".  Client rated her living situation a 2 out of 10, with 10 being the most stressful on her check-in sheet from 3/27/18, as she noted \"no alcohol in my apartment\".  She noted the following sober activities from the past week: gym and family road trip with my family on Sunday.         D: Check-in      Intervention: Client was asked to provide her interpretation of the following quote: \"People are not disturbed by things, but by the view they take of them\".      Assessment:  Stages of Change- Preparation  Client " "stated that she could reference this quote to her fear of animals and that \"maybe I'm not scared of the animal, but the thought of what they could do to me\".  This demonstrates that client has some awareness of how her thought processes can adversely affect her. This could be an important quote to continue to reference for this client, as she appears to struggle in making changes in her recovery, such as in attending meetings or in exploring sober hobbies/activities.           Plan:  Attend Phase II group therapy on 4/3/18.             Continue to go to the gym and spend time with supportive friends             Interview for [employer] job in a few weeks             Explore additional sober supports                "

## 2018-03-29 NOTE — PROGRESS NOTES
Case consultation note:  D)  Therapist consulted re ct status.  DOC alc  Ct continues to meet her goal of increasing healthy socialization.  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  P)  Continue recovery care plan.    SOPHY Angel, LICSW, [facility]  Clinical .

## 2018-04-03 ENCOUNTER — HOSPITAL ENCOUNTER (OUTPATIENT)
Dept: BEHAVIORAL HEALTH | Facility: CLINIC | Age: 23
End: 2018-04-03
Attending: SOCIAL WORKER
Payer: COMMERCIAL

## 2018-04-03 PROCEDURE — H2035 A/D TX PROGRAM, PER HOUR: HCPCS | Mod: HQ

## 2018-04-03 NOTE — PROGRESS NOTES
CD ADULT Progress Note     Treatment Plan Review completed on: 4/3/2018    Client turned her safety plan in on 12/13/17 (later than expected) as she was unsure how to answer some of the questions.  Therefore, client's Acknowledgement of Current Treatment Plan was signed by client and counseling intern on 12/13/17.      Phase II Attendance Dates: 4/3/18  Total # of Phase II Group Sessions: 7    Total # of Phase I Group Sessions:  Orientation on 11/22/17 + 21 sessions of Phase I     MONDAY TUESDAY WEDNESDAY THURSDAY FRIDAY SATURDAY SUNDAY Total   Group Therapy  1.5 hrs       1.5 hrs   Specialty Groups*           1:1           Family Program           [place]             Phase II             Absent           Total  1.5 hrs      1.5 hrs     *Specialty Groups include Mental Health Care, Assertiveness and Communication, Sobriety Maintenance Skills, Spiritual Care, Stress Management, Relapse Prevention, Family Systems.                    Learning Style:  Visual    Staff member contributing:  Marely Wilkerson, MS, LADC, LPCC and Ruperto Reeves, Intern    Received supervision:  No    Client:  contributed to goals and plan    Did Client receive a copy of treatment plan/revised plan:  Yes    Changes to Treatment Plan:      Client agrees with plan/revised plan:  Yes    1) Care Coordination Activities:  no  2) Medical, Mental Health and other appointments the client attended: none  3) Medication issues: no change  4) Physical and mental health problems: See dimension 2  5) Review and evaluation of the individual abuse prevention plan: NA    Any changes in Vulnerable Adult Status:  No    Substance Use Disorders:  None and Alcohol Use Disorder Mild (F10.10)      ASAM Risk Ratings and Data       DIMENSION 1: Acute Intoxication/Withdrawal  The client's ability to cope with withdrawal symptoms and current state of intoxication       Acute Intoxication/Withdrawal - Current Risk Factor:  0    Reporting sober date of  "9/21/17    Goals:  Client will develop effective strategies to maintain abstinence.    Data:  Client did not demonstrate or report any signs/symptoms of intoxication or withdrawal.  Client confirmed sobriety date.      DIMENSION 2:  Biomedical Conditions and Complaints  The degree to which any physical disorder would interfere with treatment for substance abuse and the client's ability to tolerate any related discomfort     Biomedical Conditions and Complaints - Current Risk Factor:  0    Goals: Client will continue to effectively manage her overall physical health    Data:   Client does report having a PCP.  Client rated her overall health at a 9, with 10 being the highest, on her check-in sheet from 4/3/18.  She endorses exercising and being around friends for self-care; she noted no changes to her physical health this week.        DIMENSION 3:  Emotional/Behavioral/Cognitive Conditions and Complications  The degree to which any condition or complications are likely to interfere with treatment for substance abuse or with function in significant life areas and the likelihood of risk of harm to self or others.     Emotional/Behavioral - Current Risk Factor:  1    DSM-5 Diagnoses:   Client completed a Diagnostic Assessment with this  on 12/27/17.  No mental health diagnoses were evident.  Client reports no past or current mental health concerns.      Suicide Assessment:  Risk Status    Ideation - Active thoughts of suicide Intent to follow through on suicide Plan for completing suicide    Yes No Yes No Yes No   Emergent  X  X  X   Urgent / Non-Emergent  X  X  X   Non- Urgent  X  X  X   No Current/Active Risk   X  X  X     Goals: Client will gain insight regarding her interest in various career fields and gain awareness regarding her identity to focus on what in particular she wants to improve in herself.     Data:  Client denied any past or current SI and SIB.      4/3/18: Client noted feeling \"frustrated and " "motivated\".  She denied experiencing any mental health symptoms.  She reported spending time with friends and family and attending Gnosticism for the Easter holiday.  Client completed and presented her \"Wellness Worksheet\" assignment, which explored her values, etc...  In one of her answers, client noted that she can be \"harsh\" towards others, and that she did lose a friend in the past because of this.  She stated that she can work on being more \"sensitive\" to balance the harshness.     3/27/18: Client noted feeling \"strong and happy\".  She denied any mental health concerns.  She expressed feeling happy about the rainy weather yesterday, and that she was able to walk in the rain.      Client has the following protective factors: supportive family and boyfriend, employed, has her license in [employer]. Client has the following risk factors: legal issues, lack of social sober support, and she reported that she is socially influenced when it comes to using alcohol, and has used it in the past to enjoy herself.       DIMENSION 4:  Readiness to Change  Consider the amount of support and encouragement necessary to keep the client involved in treatment.     Readiness to Change - Current Risk Factor:  1    Goals:  Client will verbalize the benefits of this outpatient treatment program.               Client will gain insight regarding her motivation towards the changes she wants to achieve, and develop               realistic goals to achieve the financial changes she wants to make in her life.     4/3/18: Client rated her motivation for sobriety at a level 9 (with 10 being the most); she listed her main motivation for sobriety as \"my relationships\" and that \"nothing\" blocks her motivation for sobriety.     3/27/18: Client noted that her main motivation for sobriety is \"maintaining my relationships\" and that \"nothing\" blocks my motivation for sobriety.  Client shared in group that she was handed a mimosa (alcoholic beverage) at " "a family get-together this past weekend, and that she was able to decline the drink.  Client stated that she did not wish to \"throw away her treatment\" for one drink.      3/20/18: Client reported on her check-in sheet from 3/20/18 that her main motivation for sobriety is \"my relationships\"; she rated her motivation level at a 8 (with 10 being the highest).  She noted that \"alcohol\" currently blocks her motivation for sobriety.  Client also reported in group that she doesn't believe that she had true relationships before, as her previous relationships revolved around the use of alcohol.  Client stated that she finds that she expresses her feelings and who she is much more easily now that she has been sober.       2/6/18: Client's risk rating has changed from a 0 to a 1.  Client was placed on a treatment contract on 2/6/18 due to receiving two unexcused absences over the past week.  While client did struggle in taking responsibility for missing group, she has been an active group participant during Phase I and admits to having a problem with alcohol use. Therefore, client will transition to Phase II beginning on 2/20/18.  During client's last Phase I group session on 2/6/18, she stated that spending time with friends and scheduling exercise time throughout the week will help her maintain consistent sober structure.      Client does report increased structure in her daily life by spending time with friends.       DIMENSION 5:  Relapse/Continued Use/Continued Problem Potential  Consider the degree to which the client recognizes relapse issues and has the skills to prevent relapse of either substance use or mental health problems.     Relapse/Continued Use/Continued Problem Potential - Current Risk Factor:  2    Goals:  Client will gain insight regarding relapse prevention skills and will demonstrate these skills and how she can apply them in social situations.    Data:  4/3/18: Client rated her craving at a level 1, " "with 10 being the highest.  She noted that \"not going out- driving to lunches/dinners\" helped minimize the cravings.  She noted that she did not experience any triggers over the last week and that she expanded her sober network by having lunch with sober friends.      3/27/18: Client rated her cravings over the last week at a level 2, with 10 being the highest.  She noted that \"thinking about my health and saving money\" helps minimize the cravings.  Client listed her biggest trigger this past week as \"being given a mimosa upon arrival at a family function\".  Client stated that having her parents at the event helped provide her with support to not drink as well as her thoughts of not wanting to throw away her treatment for one drink.  Client stated that she expanded her sober support network his past week by getting dinner with a friend on Sunday.      DIMENSION 6:  Recovery Environment  Consider the degree to which key areas of the client's life are supportive of or antagonistic to treatment participation and recovery.     Recovery Environment - Current Risk Factor:  2    Support group attended this week:  No    Did family agree to attend family week:  No    If yes:  none schedule this week    Goals:  Client will follow all directives from her , once she acquires a specific  and will expand her sober support network.     Data:  Client reported that she did not attend a sober support group meeting this past week as \"I don't want to\".  Client rated her living situation a 3 out of 10, with 10 being the most stressful on her check-in sheet from 4/3/18, as she noted \"I'm currently trying to find someone to take over my lease so I can live alone\".  She noted the following sober activities from the past week: gym, Sikhism, lunches with friends.           D: Wellness worksheet assignment      Intervention: Client was asked to elaborate on a question she answered from her \"Wellness Worksheet\" " "assignment, specifically relating to reaching out to a friend with whom she wanted to reform a friendship.      Assessment:  Stages of Change- Preparation  Client stated that she reached out to this friend about 1 month ago as she realized she had lost an important friendship to her.  Client stated that she has realized that when she was drinking, her trait of being \"harsh\" was very evident, and that she lost this particular friendship around the age of 19 due to her drinking and how it affected her behavior.  Client stated that reaching out to this friend was difficult for her, as she had to \"swallow her pride\".  Client appears to be taking important, challenging steps to create supportive and meaningful relationships.        Plan:  Attend Phase II group therapy on 4/10/18.             Continue to go to the gym and spend time with supportive friends             Interview for [employer] job              Explore additional sober supports                "

## 2018-04-10 ENCOUNTER — HOSPITAL ENCOUNTER (OUTPATIENT)
Dept: BEHAVIORAL HEALTH | Facility: CLINIC | Age: 23
End: 2018-04-10
Attending: SOCIAL WORKER
Payer: COMMERCIAL

## 2018-04-10 PROCEDURE — H2035 A/D TX PROGRAM, PER HOUR: HCPCS | Mod: HQ

## 2018-04-11 NOTE — PROGRESS NOTES
CD ADULT Progress Note     Treatment Plan Review completed on: 4/10/2018    Client turned her safety plan in on 12/13/17 (later than expected) as she was unsure how to answer some of the questions.  Therefore, client's Acknowledgement of Current Treatment Plan was signed by client and counseling intern on 12/13/17.      Phase II Attendance Dates: 4/10/18  Total # of Phase II Group Sessions: 8    Total # of Phase I Group Sessions:  Orientation on 11/22/17 + 21 sessions of Phase I     MONDAY TUESDAY WEDNESDAY THURSDAY FRIDAY SATURDAY SUNDAY Total   Group Therapy  1.5 hrs       1.5 hrs   Specialty Groups*           1:1           Family Program           [place]             Phase II             Absent           Total  1.5 hrs      1.5 hrs     *Specialty Groups include Mental Health Care, Assertiveness and Communication, Sobriety Maintenance Skills, Spiritual Care, Stress Management, Relapse Prevention, Family Systems.                    Learning Style:  Visual    Staff member contributing:  Marely Wilkerson, MS, LADC, LPCC and Ruperto Reeves, Intern    Received supervision:  No    Client:  contributed to goals and plan    Did Client receive a copy of treatment plan/revised plan:  Yes    Changes to Treatment Plan:      Client agrees with plan/revised plan:  Yes    1) Care Coordination Activities:  no  2) Medical, Mental Health and other appointments the client attended: none  3) Medication issues: no change  4) Physical and mental health problems: See dimension 2 and 3  5) Review and evaluation of the individual abuse prevention plan: NA    Any changes in Vulnerable Adult Status:  No    Substance Use Disorders:  None and Alcohol Use Disorder Mild (F10.10)      ASAM Risk Ratings and Data       DIMENSION 1: Acute Intoxication/Withdrawal  The client's ability to cope with withdrawal symptoms and current state of intoxication       Acute Intoxication/Withdrawal - Current Risk Factor:  0    Reporting sober date of  "9/21/17    Goals:  Client will develop effective strategies to maintain abstinence.    Data:  Client did not demonstrate or report any signs/symptoms of intoxication or withdrawal.  Client confirmed sobriety date.      DIMENSION 2:  Biomedical Conditions and Complaints  The degree to which any physical disorder would interfere with treatment for substance abuse and the client's ability to tolerate any related discomfort     Biomedical Conditions and Complaints - Current Risk Factor:  0    Goals: Client will continue to effectively manage her overall physical health    Data:   Client does report having a PCP.  Client rated her overall health at a 9, with 10 being the highest, on her check-in sheet from 4/10/18.  She endorses eating, exercising and sleeping for self-care; she noted no changes to her physical health this week.        DIMENSION 3:  Emotional/Behavioral/Cognitive Conditions and Complications  The degree to which any condition or complications are likely to interfere with treatment for substance abuse or with function in significant life areas and the likelihood of risk of harm to self or others.     Emotional/Behavioral - Current Risk Factor:  1    DSM-5 Diagnoses:   Client completed a Diagnostic Assessment with this  on 12/27/17.  No mental health diagnoses were evident.  Client reports no past or current mental health concerns.      Suicide Assessment:  Risk Status    Ideation - Active thoughts of suicide Intent to follow through on suicide Plan for completing suicide    Yes No Yes No Yes No   Emergent  X  X  X   Urgent / Non-Emergent  X  X  X   Non- Urgent  X  X  X   No Current/Active Risk   X  X  X     Goals: Client will gain insight regarding her interest in various career fields and gain awareness regarding her identity to focus on what in particular she wants to improve in herself.     Data:  Client denied any past or current SI and SIB.      4/10/18: Client noted feeling \"excited and " "anxious\" and feeling \"good\" about finding a new apartment where she can live on her own.  Client stated that she is excited that she will be able to live independently very soon, without having a roommate.  Client denied experiencing any mental health symptoms.      4/3/18: Client noted feeling \"frustrated and motivated\".  She denied experiencing any mental health symptoms.  She reported spending time with friends and family and attending [employer] for the Easter holiday.  Client completed and presented her \"Wellness Worksheet\" assignment, which explored her values, etc...  In one of her answers, client noted that she can be \"harsh\" towards others, and that she did lose a friend in the past because of this.  She stated that she can work on being more \"sensitive\" to balance the harshness.        Client has the following protective factors: supportive family and boyfriend, employed, [place] [employer]. Client has the following risk factors: legal issues, lack of social sober support, and she reported that she is socially influenced when it comes to using alcohol, and has used it in the past to enjoy herself.       DIMENSION 4:  Readiness to Change  Consider the amount of support and encouragement necessary to keep the client involved in treatment.     Readiness to Change - Current Risk Factor:  1    Goals:  Client will verbalize the benefits of this outpatient treatment program.               Client will gain insight regarding her motivation towards the changes she wants to achieve, and develop               realistic goals to achieve the financial changes she wants to make in her life.     4/10/18: Client rated her motivation for sobriety at level 10 and listed her main motivation for sobriety as her relationships and her future.  She noted that \"nothing\" blocks her motivation for sobriety.  Client is demonstrating increased motivation in her life right now by working towards her goal of increased " "independence.  Client has found someone to sublet her room in her current apartment with her roommate, so that she can begin living on her own in another apartment.  Client reports placing a high priority on her independence.      4/3/18: Client rated her motivation for sobriety at a level 9 (with 10 being the most); she listed her main motivation for sobriety as \"my relationships\" and that \"nothing\" blocks her motivation for sobriety.     3/27/18: Client noted that her main motivation for sobriety is \"maintaining my relationships\" and that \"nothing\" blocks my motivation for sobriety.  Client shared in group that she was handed a mimosa (alcoholic beverage) at a family get-together this past weekend, and that she was able to decline the drink.  Client stated that she did not wish to \"throw away her treatment\" for one drink.      3/20/18: Client reported on her check-in sheet from 3/20/18 that her main motivation for sobriety is \"my relationships\"; she rated her motivation level at a 8 (with 10 being the highest).  She noted that \"alcohol\" currently blocks her motivation for sobriety.  Client also reported in group that she doesn't believe that she had true relationships before, as her previous relationships revolved around the use of alcohol.  Client stated that she finds that she expresses her feelings and who she is much more easily now that she has been sober.       2/6/18: Client's risk rating has changed from a 0 to a 1.  Client was placed on a treatment contract on 2/6/18 due to receiving two unexcused absences over the past week.  While client did struggle in taking responsibility for missing group, she has been an active group participant during Phase I and admits to having a problem with alcohol use. Therefore, client will transition to Phase II beginning on 2/20/18.  During client's last Phase I group session on 2/6/18, she stated that spending time with friends and scheduling exercise time throughout the " "week will help her maintain consistent sober structure.      Client does report increased structure in her daily life by spending time with friends.       DIMENSION 5:  Relapse/Continued Use/Continued Problem Potential  Consider the degree to which the client recognizes relapse issues and has the skills to prevent relapse of either substance use or mental health problems.     Relapse/Continued Use/Continued Problem Potential - Current Risk Factor:  2    Goals:  Client will gain insight regarding relapse prevention skills and will demonstrate these skills and how she can apply them in social situations.    Data:    4/10/18: Client rated her craving at a level 2, with 10 being the highest.  She noted that \"staying away from bars and old friends\" helps minimize the cravings.  Client noted that her biggest trigger over the past week was \"an old friend asking me if I want a drink, repeatedly\".  Client stated that she reflected on all of the time and effort she has put into treatment, and that she did not want to waste this effort by having a drink.      4/3/18: Client rated her craving at a level 1, with 10 being the highest.  She noted that \"not going out- driving to lunches/dinners\" helped minimize the cravings.  She noted that she did not experience any triggers over the last week and that she expanded her sober network by having lunch with sober friends.      3/27/18: Client rated her cravings over the last week at a level 2, with 10 being the highest.  She noted that \"thinking about my health and saving money\" helps minimize the cravings.  Client listed her biggest trigger this past week as \"being given a mimosa upon arrival at a family function\".  Client stated that having her parents at the event helped provide her with support to not drink as well as her thoughts of not wanting to throw away her treatment for one drink.  Client stated that she expanded her sober support network his past week by getting dinner " "with a friend on Sunday.      DIMENSION 6:  Recovery Environment  Consider the degree to which key areas of the client's life are supportive of or antagonistic to treatment participation and recovery.     Recovery Environment - Current Risk Factor:  2    Support group attended this week:  No    Did family agree to attend family week:  No    If yes:  none schedule this week    Goals:  Client will follow all directives from her , once she acquires a specific  and will expand her sober support network.     Data:  Client reported that she did not attend a sober support group meeting this past week as \"I don't need/want to go\".  Client rated her living situation a 8 out of 10, with 10 being the most stressful on her check-in sheet from 4/10/18, as she noted \"I'm hopefully moving 5/1/18 to an apartment by myself\".  Client stated that her roommate does not maintain his share of keeping the apartment organized and clean, which is very bothersome to client.  She noted the following sober activities from the past week: exercise, and planning a trip with my mom.  Client noted that she would like to try \"running outside\" as a new activity.             D: Discussing relapse.       Intervention: Client was asked to provide feedback to a peer relating to the peers' relapse and to explore how she could personally relate to relapsing.      Assessment:  Stages of Change- Preparation  Client became very emotional when providing feedback to a peer about peers' relapse and apologized for her intensity.  With further exploration and questioning by therapist, client admitted that it was frustrating to her that her peer had relapsed and that it reminded her of past use of her own.  Additionally, client focused on the fact that her peers' relapse was a choice; with further reflection and questioning by therapist, client realized that there were triggering incidents occurring in her peers' life and that her " peers' use may not have been very easy to control.  This group discussion demonstrates that client can be rigid in her thinking and may struggle to express her emotions in a sensitive way.  Client did demonstrate some awareness of these factors.           Plan:  Attend Phase II group therapy on 4/17/18.              Continue to practice effective expression of emotions in group therapy             Continue to go to the gym and spend time with supportive friends             Interview for boutique job              Plan for move into new apartment              Explore additional sober supports

## 2018-04-17 ENCOUNTER — HOSPITAL ENCOUNTER (OUTPATIENT)
Dept: BEHAVIORAL HEALTH | Facility: CLINIC | Age: 23
End: 2018-04-17
Attending: SOCIAL WORKER
Payer: COMMERCIAL

## 2018-04-17 PROCEDURE — H2035 A/D TX PROGRAM, PER HOUR: HCPCS | Mod: HQ

## 2018-04-18 NOTE — PROGRESS NOTES
CD ADULT Progress Note     Treatment Plan Review completed on: 4/17/2018    Client turned her safety plan in on 12/13/17 (later than expected) as she was unsure how to answer some of the questions.  Therefore, client's Acknowledgement of Current Treatment Plan was signed by client and counseling intern on 12/13/17.      Phase II Attendance Dates: 4/17/18  Total # of Phase II Group Sessions: 9    Total # of Phase I Group Sessions:  Orientation on 11/22/17 + 21 sessions of Phase I     MONDAY TUESDAY WEDNESDAY THURSDAY FRIDAY SATURDAY SUNDAY Total   Group Therapy  1.5 hrs       1.5 hrs   Specialty Groups*           1:1           Family Program           [place]             Phase II             Absent           Total  1.5 hrs      1.5 hrs     *Specialty Groups include Mental Health Care, Assertiveness and Communication, Sobriety Maintenance Skills, Spiritual Care, Stress Management, Relapse Prevention, Family Systems.                    Learning Style:  Visual    Staff member contributing:  Marely Wilkerson, MS, LADC, LPCC and Ruperto Reeves, Intern    Received supervision:  No    Client:  contributed to goals and plan    Did Client receive a copy of treatment plan/revised plan:  Yes    Changes to Treatment Plan:      Client agrees with plan/revised plan:  Yes    1) Care Coordination Activities:  no  2) Medical, Mental Health and other appointments the client attended: none  3) Medication issues: no change  4) Physical and mental health problems: See dimension 2 and 3  5) Review and evaluation of the individual abuse prevention plan: NA    Any changes in Vulnerable Adult Status:  No    Substance Use Disorders:  None and Alcohol Use Disorder Mild (F10.10)      ASAM Risk Ratings and Data       DIMENSION 1: Acute Intoxication/Withdrawal  The client's ability to cope with withdrawal symptoms and current state of intoxication       Acute Intoxication/Withdrawal - Current Risk Factor:  0    Reporting sober date of  9/21/17    Goals:  Client will develop effective strategies to maintain abstinence.    Data:  Client did not demonstrate or report any signs/symptoms of intoxication or withdrawal.  Client confirmed sobriety date.      DIMENSION 2:  Biomedical Conditions and Complaints  The degree to which any physical disorder would interfere with treatment for substance abuse and the client's ability to tolerate any related discomfort     Biomedical Conditions and Complaints - Current Risk Factor:  0    Goals: Client will continue to effectively manage her overall physical health    Data:   Client does report having a PCP.  Client rated her overall health at a 9, with 10 being the highest, on her check-in sheet from 4/17/18.  She endorses eating, exercising, shopping, watching Netflix and sleeping for self-care; she noted no changes to her physical health this week.        DIMENSION 3:  Emotional/Behavioral/Cognitive Conditions and Complications  The degree to which any condition or complications are likely to interfere with treatment for substance abuse or with function in significant life areas and the likelihood of risk of harm to self or others.     Emotional/Behavioral - Current Risk Factor:  1    DSM-5 Diagnoses:   Client completed a Diagnostic Assessment with this  on 12/27/17.  No mental health diagnoses were evident.  Client reports no past or current mental health concerns.      Suicide Assessment:  Risk Status    Ideation - Active thoughts of suicide Intent to follow through on suicide Plan for completing suicide    Yes No Yes No Yes No   Emergent  X  X  X   Urgent / Non-Emergent  X  X  X   Non- Urgent  X  X  X   No Current/Active Risk   X  X  X     Goals: Client will gain insight regarding her interest in various career fields and gain awareness regarding her identity to focus on what in particular she wants to improve in herself.     Data:  Client denied any past or current SI and SIB.      4/17/18: Client  "reported feeling \"excited and motivated\" and she listed no mental health symptoms.  Additionally, in response to a check-in question, client stated that her internal weather report is \"60 degrees and blue skies\".      4/10/18: Client noted feeling \"excited and anxious\" and feeling \"good\" about finding a new apartment where she can live on her own.  Client stated that she is excited that she will be able to live independently very soon, without having a roommate.  Client denied experiencing any mental health symptoms.      4/3/18: Client noted feeling \"frustrated and motivated\".  She denied experiencing any mental health symptoms.  She reported spending time with friends and family and attending [employer] for the Easter holiday.  Client completed and presented her \"Wellness Worksheet\" assignment, which explored her values, etc...  In one of her answers, client noted that she can be \"harsh\" towards others, and that she did lose a friend in the past because of this.  She stated that she can work on being more \"sensitive\" to balance the harshness.        Client has the following protective factors: supportive family and boyfriend, employed, [place] her license in [employer]. Client has the following risk factors: legal issues, lack of social sober support, and she reported that she is socially influenced when it comes to using alcohol, and has used it in the past to enjoy herself.       DIMENSION 4:  Readiness to Change  Consider the amount of support and encouragement necessary to keep the client involved in treatment.     Readiness to Change - Current Risk Factor:  1    Goals:  Client will verbalize the benefits of this outpatient treatment program.               Client will gain insight regarding her motivation towards the changes she wants to achieve, and develop               realistic goals to achieve the financial changes she wants to make in her life.     4/17/18: Client rated her motivation for sobriety at a level " "10, with 10 being the highest.  She listed \"my relationships\" as her main motivation for sobriety and that \"nothing\" blocks her motivation for sobriety.      4/10/18: Client rated her motivation for sobriety at level 10 and listed her main motivation for sobriety as her relationships and her future.  She noted that \"nothing\" blocks her motivation for sobriety.  Client is demonstrating increased motivation in her life right now by working towards her goal of increased independence.  Client has found someone to sublet her room in her current apartment with her roommate, so that she can begin living on her own in another apartment.  Client reports placing a high priority on her independence.      *2/6/18: Client's risk rating has changed from a 0 to a 1.  Client was placed on a treatment contract on 2/6/18 due to receiving two unexcused absences over the past week.  While client did struggle in taking responsibility for missing group, she has been an active group participant during Phase I and admits to having a problem with alcohol use. Therefore, client will transition to Phase II beginning on 2/20/18.  During client's last Phase I group session on 2/6/18, she stated that spending time with friends and scheduling exercise time throughout the week will help her maintain consistent sober structure.      Client does report increased structure in her daily life by spending time with friends.       DIMENSION 5:  Relapse/Continued Use/Continued Problem Potential  Consider the degree to which the client recognizes relapse issues and has the skills to prevent relapse of either substance use or mental health problems.     Relapse/Continued Use/Continued Problem Potential - Current Risk Factor:  2    Goals:  Client will gain insight regarding relapse prevention skills and will demonstrate these skills and how she can apply them in social situations.    Data:    4/17/18: Client rated her craving at a level 2, with 10 being the " "highest.  She noted that \"knowing it's not worth it\" helps minimize the cravings.  Client listed \"the airport\" as her biggest trigger his past week.  She noted that she did \"nothing\" to expand her sober support network over the past week.      4/10/18: Client rated her craving at a level 2, with 10 being the highest.  She noted that \"staying away from bars and old friends\" helps minimize the cravings.  Client noted that her biggest trigger over the past week was \"an old friend asking me if I want a drink, repeatedly\".  Client stated that she reflected on all of the time and effort she has put into treatment, and that she did not want to waste this effort by having a drink.        DIMENSION 6:  Recovery Environment  Consider the degree to which key areas of the client's life are supportive of or antagonistic to treatment participation and recovery.     Recovery Environment - Current Risk Factor:  2    Support group attended this week:  No    Did family agree to attend family week:  No    If yes:  none schedule this week    Goals:  Client will follow all directives from her , once she acquires a specific  and will expand her sober support network.     Data:    4/17/18: Client reported that she did not attend a sober support group meeting this past week as \"I don't need/want to go\".  She rated her living situation at an 8 out of 10 as \"I'm moving in a week and a half. Lots to do.\"  She stated that she engaged in the following sober activities: a roadtrip/shopping.      4/10/18: Client reported that she did not attend a sober support group meeting this past week as \"I don't need/want to go\".  Client rated her living situation a 8 out of 10, with 10 being the most stressful on her check-in sheet from 4/10/18, as she noted \"I'm hopefully moving 5/1/18 to an apartment by myself\".  Client stated that her roommate does not maintain his share of keeping the apartment organized and clean, " "which is very bothersome to client.  She noted the following sober activities from the past week: exercise, and planning a trip with my mom.  Client noted that she would like to try \"running outside\" as a new activity.             D: Mindfulness Activity      Intervention: Client was asked to reflect on the song \"7 years old\" by Jose Tee.      Assessment:  Stages of Change- Preparation  Client's initial response was that she found the song to be \"stupid\" without further expansion on her reaction.  With further reflection and after other group members provided their reactions to the song, client stated that \"I guess it's not stupid; I just can't relate\".  Client's response seems to demonstrate some close-mindedness and difficulty in viewing situations from multiple perspectives.  Also, her response appeared to be somewhat \"harsh\", which is a reaction she wants to decrease by being more \"sensitive\".         Plan:  Attend Phase II group therapy on 5/1/18.  (Client will be unable to attend group on 4/24/18 as she will be in [place] at that time.)              Continue to practice effective expression of emotions in group therapy             Continue to go to the gym and spend time with supportive friends             Interview for [employer] job              Plan for move into new apartment              Explore additional sober supports                "

## 2018-04-26 NOTE — PROGRESS NOTES
[facility]  Adult Chemical Dependency Program  Treatment Plan Requirements    These services are provided by the facility for each patient/client according to the individual's treatment plan:    Individual and group counseling    Education    Transition services    Services to address any co-occurring mental illness    Service coordination    Initial Treatment Plan Goals:  1. Complete all the requirements of Program Orientation.  2. Maintain medication compliance throughout the program.  3. Complete requirements for workshop/skills groups based on identified issues on your problem list.  4. Complete the support group attendance feedback sheet weekly.  5. Gain family involvement in treatment process to address family issues from the problem list.  6. Attend and participate in all required groups per individual treatment plan.  7. Focus attention to individualized issues from the treatment plan.  8. Complete all requirements for UA's, alcohol screening tests and other testing.  9. Schedule a physical examination if recommended.    In addition to the above, complete all individual goals as specifically outlines on your treatment plan.    Criteria for discharge:  Patients/clients are discharged from the program following completion of the entire program including Phase I and II or acceptance of other post-treatment referrals such as [facility], or aftercare at other facilities.  Patients/clients may also be discharged for inappropriate behavior or chemical use.      Favorable Discharge - Patients/clients have completed agreed upon treatment goals, understand their diagnosis and appear motivated about the follow-up care.    Guarded Discharge - Patients/clients have demonstrated some understanding of their diagnosis and recovery process, and have completed some of their treatment goals.  This prognosis also includes patients/clients who have completed some treatment goals but have not made  commitment to community support or follow through with referrals.    Unfavorable Discharge - Patients/clients have not completed agreed upon treatment goals due to their own choice, have limited understanding of their diagnosis, and have shown minimal or inconsistent behavior conducive to recovery.  Those patients/clients discharged due to behavioral problems will also be unfavorable discharges.                                  Adult CD Treatment Plan     Nasra Ruiz [id number]   1995 22 year old female      Substance Use Disorders: Alcohol   ------------------------------------------------------------------------------------------------------------------  Acute Intoxication/Withdrawal Potential     DIMENSION 1  RISK FACTOR: 0             AssignmentDate Source Area of Treatment Focus/Goal/  Treatment Strategies Target  Date Initials Outcome Completion  Date   12/11/17 Self -  Current  Area of Treatment Focus: Client's last use date was 9/21/17.     Goal: Client will develop effective strategies to maintain abstinence.     Treatment Strategies:  1. Client will report to staff and group any alcohol or drug use.               5/1/18               SS               Effective                5/29/18         Biomedical Conditions and Complaints     DIMENSION 2  RISK FACTOR: 0             AssignmentDate Source Area of Treatment Focus/Goal/  Treatment Strategies Target  Date Initials Outcome Completion  Date   12/11/17 Self -  Deferred  Area of Treatment Focus: Client denied any current physical health concerns.     Goal: Client will continue to effectively manage her overall physical health.    Treatment Strategies:   1. Client will continue to follow-up with her PCP, as needed.               5/1/18               SS               Effective                5/29/18       -----------------------------------------------------------------------------------------------------------------    Emotional/Behavioral/Cognitive  "Conditions and Complications     DIMENSION 3  RISK FACTOR: 1             AssignmentDate Source Area of Treatment Focus/Goal/  Treatment Strategies Target  Date Initials Outcome Completion  Date   12/11/17 Self -  Current  Area of Treatment Focus: Client denied any current or past mental health concerns/issues. Client reported that she wants to change her focus regarding her career and wants to better herself.    Goal: Client will gain insight regarding her interest in various career fields and gain awareness regarding her identity to focus on what in particular she wants to improve in herself.     Treatment Strategies:  1. Client will complete the autobiography assignment and share her experience with the group.     2. Client will create an identity map and determine various roles she currently hold in her life.     3. Client will identify 5 strengths she holds and explain how she can use these strengths to better herself and help her achieve personal goals.     4. Client will complete the \"self-exploration: identity, values, experiences, and goals\" worksheet.     5. Client will complete a \"career clusters\" worksheet and share her experience with the group.                           12/20/17        1/3/18        1/3/18          1/8/18        1/24/18                           APA/SS        APA/SS        APA/SS          APA/SS        APA/SS                           Effective         Effective        Effective          Effective        Effective                           1/17/18        1/3/18        3/13/18          4/3/18        1/24/18     -------------------------------------------------------------------------------------------------------------------     Readiness to Change     DIMENSION 4  RISK FACTOR: 0             AssignmentDate Source Area of Treatment Focus/Goal/  Treatment Strategies Target  Date Initials Outcome Completion  Date   12/11/17 Self -  Current  Area of Treatment Focus: Client has struggled to " maintain long-term sobriety and is need of an outpatient treatment program.    Goal: Client will verbalize the benefits of this outpatient treatment program.    Treatment Strategies:  1. Client will attend three, two hour sessions of group a week and individual sessions as needed throughout the course of Phase I treatment.    2. Client will attend a 1.5 hour session of group a week, and individual sessions as needed throughout the course of Phase II treatment.     Area of Treatment Focus: Client verbalized changes she wants to make regarding her finances and to improve her overall lifestyle.     Goal: Client will gain insight regarding her motivation towards the changes she wants to achieve, and develop realistic goals to achieve the financial changes she wants to make in her life.     Treatment Strategies:  1. Client will complete a motivation and change assignment.     2. Client will develop a SMART goal regarding her finances and money she wants to save, and share her goal and progress in group therapy. Client will be given a financial SMART goal to use as a guide.                  2/5/18 5/1/18 12/18/17 12/27/17                 APA/SS          SS                              APA/SS      APA/SS                 Effective          Effective                               Effective      Client reported on 2/6/18 (1:1 session) that she did not need to complete this anymore as she has already been saving money on her own.                  2/7/18 5/29/18 12/18/17 2/6/18     -------------------------------------------------------------------------------------------------------------------     Relapse/Continues Use/Continues Problem Potential     DIMENSION 5  RISK FACTOR: 2                 AssignmentDate Source Area of Treatment Focus/Goal/  Treatment Strategies  Target  Date Initials Outcome Completion  Date   12/11/17 Self -  Current   "Area of Treatment Focus: Client lacks insight to relapse prevention skills and reports that she drinks in social situations and is socially influenced by others when it comes to using alcohol.     Goal: Client will gain insight regarding relapse prevention skills and will demonstrate these skills and how she can apply them in social situations.     Treatment Strategies:  1.Client will complete the \"pros and cons of quitting\" and determine the positive and negative immediate and long-term consequences of using alcohol to gain awareness regarding her use.     2. Client will complete the \"social pressures\" worksheet to determine social pressures that lead to the use of alcohol. Client will determine 5 social pressure situations, their level of threat and a coping skill she can use when faced with this situation.     3. Client will complete the \"relapse warning signs\" worksheet and identify 5 relapse warning signs and coping skills.     4. Client will complete the recovery care plan while in Phase II.                         1/10/18            1/15/18                1/15/18        4/17/18                                     APA/SS            APA/SS                APA/SS        SS                         Effective             Effective                Effective        Effective                          1/10/18            1/22/18                1/22/18        5/29/18     Recovery Environment     DIMENSION 6  RISK FACTOR: 2                 AssignmentDate Source Area of Treatment Focus/Goal/  Treatment Strategies Target  Date Initials Outcome Completion  Date   12/11/17 Self -  Current  Area of Treatment Focus: Client reported that she is currently on probation due to a DWI she received in August 2017 and lacks a sober support network.    Goal: Client will follow all directives from her , once she acquires a specific  and will expand her sober support friend network.     Treatment " "Strategies:  1. Client will verbalize her specific probation requirements in group therapy. If needed, request feedback from peers.     2. Client will attend a sober support meeting and share experience with the group.                              3. Client will attain numbers of at least one individual from a sober support meeting she attends or identify one sober friend she can contact.     4. Client will contact this individual and schedule a social gathering, such as coffee.                        5/1/18 1/17/18 1/17/18 1/22/18                             SS        APA/SS                                APA/SS            APA/SS                        Effective         Client reported that she does not want to attend a sober support meeting; she will consider attending [facility].    Effective          Effective                       5/1/18 2/7/18 2/20/18     All interventions that are designated as \"current\" will need to be completed in order to transition out of treatment with a favorable prognosis. The treatment plan is a flexible document and a work in progress. Interventions and goals may be added at any time to customize the treatment plan to each individual's needs. Client may work with therapist/counselor to change interventions as long as they pertain to the goals stipulated in the plan and/or are clinically driven.     Individual abuse prevention plan (required for lodging plus) : specific actions, referral:   No additional protection measures required other than the Program Abuse Prevention Plan - NO      "

## 2018-05-01 ENCOUNTER — HOSPITAL ENCOUNTER (OUTPATIENT)
Dept: BEHAVIORAL HEALTH | Facility: CLINIC | Age: 23
End: 2018-05-01
Attending: SOCIAL WORKER
Payer: COMMERCIAL

## 2018-05-01 PROCEDURE — H2035 A/D TX PROGRAM, PER HOUR: HCPCS | Mod: HQ

## 2018-05-01 NOTE — PROGRESS NOTES
CD ADULT Progress Note     Treatment Plan Review completed on: 5/1/2018    Client turned her safety plan in on 12/13/17 (later than expected) as she was unsure how to answer some of the questions.  Therefore, client's Acknowledgement of Current Treatment Plan was signed by client and counseling intern on 12/13/17.      Phase II Attendance Dates: 5/1/18  Total # of Phase II Group Sessions: 10    Total # of Phase I Group Sessions:  Orientation on 11/22/17 + 21 sessions of Phase I     MONDAY TUESDAY WEDNESDAY THURSDAY FRIDAY SATURDAY SUNDAY Total   Group Therapy  1.5 hrs       1.5 hrs   Specialty Groups*           1:1           Family Program           [place]             Phase II             Absent           Total  1.5 hrs      1.5 hrs     *Specialty Groups include Mental Health Care, Assertiveness and Communication, Sobriety Maintenance Skills, Spiritual Care, Stress Management, Relapse Prevention, Family Systems.                    Learning Style:  Visual    Staff member contributing:  Marely Wilkerson, MS, LADC, LPCC and Ruperto Reeves, Intern    Received supervision:  No    Client:  contributed to goals and plan    Did Client receive a copy of treatment plan/revised plan:  Yes    Changes to Treatment Plan:      Client agrees with plan/revised plan:  Yes    1) Care Coordination Activities:  no  2) Medical, Mental Health and other appointments the client attended: none  3) Medication issues: no change  4) Physical and mental health problems: See dimension 2 and 3  5) Review and evaluation of the individual abuse prevention plan: NA    Any changes in Vulnerable Adult Status:  No    Substance Use Disorders:  None and Alcohol Use Disorder Mild (F10.10)      ASAM Risk Ratings and Data       DIMENSION 1: Acute Intoxication/Withdrawal  The client's ability to cope with withdrawal symptoms and current state of intoxication       Acute Intoxication/Withdrawal - Current Risk Factor:  0    Reporting sober date of  "9/21/17    Goals:  Client will develop effective strategies to maintain abstinence.    Data:  Client did not demonstrate or report any signs/symptoms of intoxication or withdrawal.  Client confirmed sobriety date.      DIMENSION 2:  Biomedical Conditions and Complaints  The degree to which any physical disorder would interfere with treatment for substance abuse and the client's ability to tolerate any related discomfort     Biomedical Conditions and Complaints - Current Risk Factor:  0    Goals: Client will continue to effectively manage her overall physical health    Data:   Client does report having a PCP.  Client rated her overall health at a 8, with 10 being the highest, on her check-in sheet from 5/1/18.  She endorses sleeping for self-care; she noted no changes to her physical health this week.        DIMENSION 3:  Emotional/Behavioral/Cognitive Conditions and Complications  The degree to which any condition or complications are likely to interfere with treatment for substance abuse or with function in significant life areas and the likelihood of risk of harm to self or others.     Emotional/Behavioral - Current Risk Factor:  1    DSM-5 Diagnoses:   Client completed a Diagnostic Assessment with this  on 12/27/17.  No mental health diagnoses were evident.  Client reports no past or current mental health concerns.      Suicide Assessment:  Risk Status    Ideation - Active thoughts of suicide Intent to follow through on suicide Plan for completing suicide    Yes No Yes No Yes No   Emergent  X  X  X   Urgent / Non-Emergent  X  X  X   Non- Urgent  X  X  X   No Current/Active Risk   X  X  X     Goals: Client will gain insight regarding her interest in various career fields and gain awareness regarding her identity to focus on what in particular she wants to improve in herself.     Data:  Client denied any past or current SI and SIB.      5/1/18: Client reported feeling \"motivated and happy\" on this date.  " "She stated that she enjoyed her time in [place] with her boyfriend and that she is now officially moved into her new apartment, living on her own, for which she is proud of herself.      4/17/18: Client reported feeling \"excited and motivated\" and she listed no mental health symptoms.  Additionally, in response to a check-in question, client stated that her internal weather report is \"60 degrees and blue skies\".      4/10/18: Client noted feeling \"excited and anxious\" and feeling \"good\" about finding a new apartment where she can live on her own.  Client stated that she is excited that she will be able to live independently very soon, without having a roommate.  Client denied experiencing any mental health symptoms.      4/3/18: Client noted feeling \"frustrated and motivated\".  She denied experiencing any mental health symptoms.  She reported spending time with friends and family and attending [employer] for the Easter holiday.  Client completed and presented her \"Wellness Worksheet\" assignment, which explored her values, etc...  In one of her answers, client noted that she can be \"harsh\" towards others, and that she did lose a friend in the past because of this.  She stated that she can work on being more \"sensitive\" to balance the harshness.        Client has the following protective factors: supportive family and boyfriend, employed, has her license in [employer]. Client has the following risk factors: legal issues, lack of social sober support, and she reported that she is socially influenced when it comes to using alcohol, and has used it in the past to enjoy herself.       DIMENSION 4:  Readiness to Change  Consider the amount of support and encouragement necessary to keep the client involved in treatment.     Readiness to Change - Current Risk Factor:  1    Goals:  Client will verbalize the benefits of this outpatient treatment program.               Client will gain insight regarding her motivation towards the " "changes she wants to achieve, and develop               realistic goals to achieve the financial changes she wants to make in her life.     5/1/18: Client rated her motivation for sobriety at a level 10, with 10 being the highest.  She noted that her main motivation for sobriety is \"my relationships and my well being\".  She noted that \"nothing\" blocks her motivation for sobriety.  Additionally, it should be noted that client moved into an apartment, living on her own this past weekend, which is a goal she has been working towards over approximately the past month.      4/17/18: Client rated her motivation for sobriety at a level 10, with 10 being the highest.  She listed \"my relationships\" as her main motivation for sobriety and that \"nothing\" blocks her motivation for sobriety.      4/10/18: Client rated her motivation for sobriety at level 10 and listed her main motivation for sobriety as her relationships and her future.  She noted that \"nothing\" blocks her motivation for sobriety.  Client is demonstrating increased motivation in her life right now by working towards her goal of increased independence.  Client has found someone to sublet her room in her current apartment with her roommate, so that she can begin living on her own in another apartment.  Client reports placing a high priority on her independence.      *2/6/18: Client's risk rating has changed from a 0 to a 1.  Client was placed on a treatment contract on 2/6/18 due to receiving two unexcused absences over the past week.  While client did struggle in taking responsibility for missing group, she has been an active group participant during Phase I and admits to having a problem with alcohol use. Therefore, client will transition to Phase II beginning on 2/20/18.  During client's last Phase I group session on 2/6/18, she stated that spending time with friends and scheduling exercise time throughout the week will help her maintain consistent sober " "structure.      Client does report increased structure in her daily life by spending time with friends.       DIMENSION 5:  Relapse/Continued Use/Continued Problem Potential  Consider the degree to which the client recognizes relapse issues and has the skills to prevent relapse of either substance use or mental health problems.     Relapse/Continued Use/Continued Problem Potential - Current Risk Factor:  2    Goals:  Client will gain insight regarding relapse prevention skills and will demonstrate these skills and how she can apply them in social situations.    Data:    5/1/18: Client rated her craving at a level 1, with 10 being the highest.  She noted that remembering that \"nothing positive comes from it\" helps minimize the cravings.  Client did not list any triggers from the past week and she noted that she did \"nothing\" to expand her sober support network over the past week.      4/17/18: Client rated her craving at a level 2, with 10 being the highest.  She noted that \"knowing it's not worth it\" helps minimize the cravings.  Client listed \"the airport\" as her biggest trigger his past week.  She noted that she did \"nothing\" to expand her sober support network over the past week.      4/10/18: Client rated her craving at a level 2, with 10 being the highest.  She noted that \"staying away from bars and old friends\" helps minimize the cravings.  Client noted that her biggest trigger over the past week was \"an old friend asking me if I want a drink, repeatedly\".  Client stated that she reflected on all of the time and effort she has put into treatment, and that she did not want to waste this effort by having a drink.        DIMENSION 6:  Recovery Environment  Consider the degree to which key areas of the client's life are supportive of or antagonistic to treatment participation and recovery.     Recovery Environment - Current Risk Factor:  2    Support group attended this week:  No    Did family agree to attend " "family week:  No    If yes:  none schedule this week    Goals:  Client will follow all directives from her , once she acquires a specific  and will expand her sober support network.     Data:      5/1/18: Client reported that she did not attend a sober support group meeting this past week as \"I don't want to\"; she also noted that she does not have a sponsor.  She listed the following sober activities this past week: hiking in [place] and shopping.  Client also stated that she wants to try riding her bike outside, now that the weather is nice.      4/17/18: Client reported that she did not attend a sober support group meeting this past week as \"I don't need/want to go\".  She rated her living situation at an 8 out of 10 as \"I'm moving in a week and a half. Lots to do.\"  She stated that she engaged in the following sober activities: a roadtrip/shopping.      4/10/18: Client reported that she did not attend a sober support group meeting this past week as \"I don't need/want to go\".  Client rated her living situation a 8 out of 10, with 10 being the most stressful on her check-in sheet from 4/10/18, as she noted \"I'm hopefully moving 5/1/18 to an apartment by myself\".  Client stated that her roommate does not maintain his share of keeping the apartment organized and clean, which is very bothersome to client.  She noted the following sober activities from the past week: exercise, and planning a trip with my mom.  Client noted that she would like to try \"running outside\" as a new activity.             D: Check-in question       Intervention: Client was asked to share a goal she has for herself this week.       Assessment:  Stages of Change- Preparation  Client stated that she plans on moving clothes out of her boxes throughout the week to become fully settled in her new apartment.  Client does appear to be taking steps to develop her independence.  While client is making changes in her life that " "appear to be satisfying to her and which satisfy goals she has set for herself, she has not taken any further steps to explore/develop sober supports.  This will be further explored in group on 5/8/18, as client was asked to share what she can do to increase her spirituality, or what client referred to as \"wellness of the soul\".          Plan:  Attend Phase II group therapy on 5/8/18.               Continue to practice effective expression of emotions in group therapy             Continue to go to the gym and spend time with supportive friends              Plan for move into new apartment              Explore additional sober supports                "

## 2018-05-08 ENCOUNTER — HOSPITAL ENCOUNTER (OUTPATIENT)
Dept: BEHAVIORAL HEALTH | Facility: CLINIC | Age: 23
End: 2018-05-08
Attending: SOCIAL WORKER
Payer: COMMERCIAL

## 2018-05-08 PROCEDURE — H2035 A/D TX PROGRAM, PER HOUR: HCPCS | Mod: HQ

## 2018-05-08 NOTE — PROGRESS NOTES
CD ADULT Progress Note     Treatment Plan Review completed on: 5/8/2018    Client turned her safety plan in on 12/13/17 (later than expected) as she was unsure how to answer some of the questions.  Therefore, client's Acknowledgement of Current Treatment Plan was signed by client and counseling intern on 12/13/17.      Phase II Attendance Dates: 5/8/18  Total # of Phase II Group Sessions: 11    Total # of Phase I Group Sessions:  Orientation on 11/22/17 + 21 sessions of Phase I     MONDAY TUESDAY WEDNESDAY THURSDAY FRIDAY SATURDAY SUNDAY Total   Group Therapy  1.5 hrs       1.5 hrs   Specialty Groups*           1:1           Family Program           [place]             Phase II             Absent           Total  1.5 hrs      1.5 hrs     *Specialty Groups include Mental Health Care, Assertiveness and Communication, Sobriety Maintenance Skills, Spiritual Care, Stress Management, Relapse Prevention, Family Systems.                    Learning Style:  Visual    Staff member contributing:  Marely Wilkerson, MS, LADC, LPCC and Ruperto Reeves, Intern    Received supervision:  No    Client:  contributed to goals and plan    Did Client receive a copy of treatment plan/revised plan:  Yes    Changes to Treatment Plan:      Client agrees with plan/revised plan:  Yes    1) Care Coordination Activities:  no  2) Medical, Mental Health and other appointments the client attended: none  3) Medication issues: no change  4) Physical and mental health problems: See dimension 2 and 3  5) Review and evaluation of the individual abuse prevention plan: NA    Any changes in Vulnerable Adult Status:  No    Substance Use Disorders:  None and Alcohol Use Disorder Mild (F10.10)      ASAM Risk Ratings and Data       DIMENSION 1: Acute Intoxication/Withdrawal  The client's ability to cope with withdrawal symptoms and current state of intoxication       Acute Intoxication/Withdrawal - Current Risk Factor:  0    Reporting sober date of  "9/21/17    Goals:  Client will develop effective strategies to maintain abstinence.    Data:    5/8/18: Client did not demonstrate or report any signs/symptoms of intoxication or withdrawal.  Client confirmed sobriety date.      DIMENSION 2:  Biomedical Conditions and Complaints  The degree to which any physical disorder would interfere with treatment for substance abuse and the client's ability to tolerate any related discomfort     Biomedical Conditions and Complaints - Current Risk Factor:  0    Goals: Client will continue to effectively manage her overall physical health    Data:     5/8/18: Client does report having a PCP.  Client rated her overall health at a 10, with 10 being the highest.  She reports cooking dinner and exercising for self-care; she noted no changes to her physical health this week.        DIMENSION 3:  Emotional/Behavioral/Cognitive Conditions and Complications  The degree to which any condition or complications are likely to interfere with treatment for substance abuse or with function in significant life areas and the likelihood of risk of harm to self or others.     Emotional/Behavioral - Current Risk Factor:  1    DSM-5 Diagnoses:   Client completed a Diagnostic Assessment with this  on 12/27/17.  No mental health diagnoses were evident.  Client reports no past or current mental health concerns.      Suicide Assessment:  Risk Status    Ideation - Active thoughts of suicide Intent to follow through on suicide Plan for completing suicide    Yes No Yes No Yes No   Emergent  X  X  X   Urgent / Non-Emergent  X  X  X   Non- Urgent  X  X  X   No Current/Active Risk   X  X  X     Goals: Client will gain insight regarding her interest in various career fields and gain awareness regarding her identity to focus on what in particular she wants to improve in herself.     Data:  Client denied any past or current SI and SIB.      5/8/18: Client reported feeling \"anxious and excited\" on this " "date.  She rated no symptoms higher than a 1, with 10 being the most severe.  She stated that she is very glad to be living on her own now in an apartment and she is glad that she is a short commute time to group therapy.       5/1/18: Client reported feeling \"motivated and happy\" on this date.  She stated that she enjoyed her time in [place] with her boyfriend and that she is now officially moved into her new apartment, living on her own, for which she is proud of herself.         Client has the following protective factors: supportive family and boyfriend, employed, has her license in [employer]. Client has the following risk factors: legal issues, lack of social sober support, and she reported that she is socially influenced when it comes to using alcohol, and has used it in the past to enjoy herself.       DIMENSION 4:  Readiness to Change  Consider the amount of support and encouragement necessary to keep the client involved in treatment.     Readiness to Change - Current Risk Factor:  1    Goals:  Client will verbalize the benefits of this outpatient treatment program.               Client will gain insight regarding her motivation towards the changes she wants to achieve, and develop               realistic goals to achieve the financial changes she wants to make in her life.     Data:     5/8/18: Client rated her motivation for sobriety at a level 10, with 10 being the highest.  She noted that her main motivation for sobriety is \"my relationships\" and that \"nothing\" blocks her motivation for sobriety.  Client has now moved into an apartment on her own and she continues to report spending time with friends who are aware of and respect her sobriety.      5/1/18: Client rated her motivation for sobriety at a level 10, with 10 being the highest.  She noted that her main motivation for sobriety is \"my relationships and my well being\".  She noted that \"nothing\" blocks her motivation for sobriety.  Additionally, it " "should be noted that client moved into an apartment, living on her own this past weekend, which is a goal she has been working towards over approximately the past month.      *2/6/18: Client's risk rating has changed from a 0 to a 1.  Client was placed on a treatment contract on 2/6/18 due to receiving two unexcused absences over the past week.  While client did struggle in taking responsibility for missing group, she has been an active group participant during Phase I and admits to having a problem with alcohol use. Therefore, client will transition to Phase II beginning on 2/20/18.  During client's last Phase I group session on 2/6/18, she stated that spending time with friends and scheduling exercise time throughout the week will help her maintain consistent sober structure.      Client does report increased structure in her daily life by spending time with friends.       DIMENSION 5:  Relapse/Continued Use/Continued Problem Potential  Consider the degree to which the client recognizes relapse issues and has the skills to prevent relapse of either substance use or mental health problems.     Relapse/Continued Use/Continued Problem Potential - Current Risk Factor:  2    Goals:  Client will gain insight regarding relapse prevention skills and will demonstrate these skills and how she can apply them in social situations.    Data:    5/8/18: Client rated her craving at a level 2, with 10 being the highest.  She noted that \"staying out of bars and drinking situations\" helps minimize the cravings.  She listed no triggers from this past week; she noted that did not do anything to expand her sober support network this past week.      5/1/18: Client rated her craving at a level 1, with 10 being the highest.  She noted that remembering that \"nothing positive comes from it\" helps minimize the cravings.  Client did not list any triggers from the past week and she noted that she did \"nothing\" to expand her sober support " "network over the past week.        DIMENSION 6:  Recovery Environment  Consider the degree to which key areas of the client's life are supportive of or antagonistic to treatment participation and recovery.     Recovery Environment - Current Risk Factor:  2    Support group attended this week:  No    Did family agree to attend family week:  No    If yes:  none schedule this week    Goals:  Client will follow all directives from her , once she acquires a specific  and will expand her sober support network.     Data:      5/8/18: Client reported no sober support group meeting attendance this past week as \"I don't want to\" and she reported not having a sponsor.  She listed the following sober activities: walked around [facility], and tried a new gym.  She also noted that she would like to try the following new activities: biking more.  She rated her living situation at a 1, with 10 being the most stressful, as she noted \"I love living alone\".       5/1/18: Client reported that she did not attend a sober support group meeting this past week as \"I don't want to\"; she also noted that she does not have a sponsor.  She listed the following sober activities this past week: hiking in [place] and shopping.  Client also stated that she wants to try riding her bike outside, now that the weather is nice.           D: What would my relapse look like?    (assignment)     Intervention: Client was asked how this assignment was purposeful for her to complete.         Assessment:  Stages of Change- Preparation  Client stated that she realized how easy it would be to relapse and how easy it was for her to remember what it is like to drink.  Additionally, client stated that she would typically drink in the evenings, and this is why she doesn't go out in the evenings with specific people.  It appears that client has important awareness of how easy it would be to relapse, as well as having a plan to avoid " drinking.         Plan:  Attend Phase II group therapy on 5/29/18.               Continue to practice effective expression of emotions in group therapy             Continue to go to the gym and spend time with supportive friends              Explore additional sober supports

## 2018-05-09 NOTE — PROGRESS NOTES
Case consultation:  D)  Therapist presented ct's case to review status.  Ct is 23 yo and on probation due to a DWI.  DOC alcohol.  Ct has moved into her own apartment and is having regular lunch dates with friends.  She is working and has an exercise program.  Ct's father is in recovery.  Ct does not have a sober support group and does not want to attend AA or similar meetings because she states she finds they trigger her.  A)  Ct may find it difficult to discuss sobriety and recovery issues that arise if she does not invest in sober support.  P)  Help ct to explore alternative support such as recovery yoga so she can connect to others in recovery around an activity rather than sobriety only.    Soraida Hawkins, MSW, LICSW, Formerly named [facility]  Clinical

## 2018-05-17 NOTE — ADDENDUM NOTE
Encounter addended by: Marely Wilkerson [facility] on: 5/17/2018  5:18 PM<BR>     Actions taken: Sign clinical note

## 2018-05-29 ENCOUNTER — HOSPITAL ENCOUNTER (OUTPATIENT)
Dept: BEHAVIORAL HEALTH | Facility: CLINIC | Age: 23
End: 2018-05-29
Attending: SOCIAL WORKER
Payer: COMMERCIAL

## 2018-05-29 PROCEDURE — H2035 A/D TX PROGRAM, PER HOUR: HCPCS | Mod: HQ

## 2018-05-29 NOTE — PROGRESS NOTES
CD ADULT Progress Note     Treatment Plan Review completed on: 5/29/2018    Client turned her safety plan in on 12/13/17 (later than expected) as she was unsure how to answer some of the questions.  Therefore, client's Acknowledgement of Current Treatment Plan was signed by client and counseling intern on 12/13/17.      Phase II Attendance Dates: 5/29/18  Total # of Phase II Group Sessions: 12    Total # of Phase I Group Sessions:  Orientation on 11/22/17 + 21 sessions of Phase I     MONDAY TUESDAY WEDNESDAY THURSDAY FRIDAY SATURDAY SUNDAY Total   Group Therapy  1.5 hrs       1.5 hrs   Specialty Groups*           1:1  .5 hrs      .5 hrs   Family Program           [place]             Phase II             Absent           Total  2.0 hrs      2.0 hrs     *Specialty Groups include Mental Health Care, Assertiveness and Communication, Sobriety Maintenance Skills, Spiritual Care, Stress Management, Relapse Prevention, Family Systems.                    Learning Style:  Visual    Staff member contributing:  Marely Wilkerson, MS, LADC, LPCC     Received supervision:  No    Client:  contributed to goals and plan    Did Client receive a copy of treatment plan/revised plan:  Yes    Changes to Treatment Plan:      Client agrees with plan/revised plan:  Yes    1) Care Coordination Activities:  no  2) Medical, Mental Health and other appointments the client attended: none  3) Medication issues: no change  4) Physical and mental health problems: See dimension 2 and 3  5) Review and evaluation of the individual abuse prevention plan: NA    Any changes in Vulnerable Adult Status:  No    Substance Use Disorders:  None and Alcohol Use Disorder Mild (F10.10)      ASAM Risk Ratings and Data       DIMENSION 1: Acute Intoxication/Withdrawal  The client's ability to cope with withdrawal symptoms and current state of intoxication       Acute Intoxication/Withdrawal - Current Risk Factor:  0    Reporting sober date of 9/21/17    Goals:  Client  "will develop effective strategies to maintain abstinence.    Data:    5/29/18: Client did not demonstrate or report any signs/symptoms of intoxication or withdrawal.  Client confirmed sobriety date.      DIMENSION 2:  Biomedical Conditions and Complaints  The degree to which any physical disorder would interfere with treatment for substance abuse and the client's ability to tolerate any related discomfort     Biomedical Conditions and Complaints - Current Risk Factor:  0    Goals: Client will continue to effectively manage her overall physical health    Data:     5/29/18: Client does report having a PCP.  Client rated her overall health at a 9, with 10 being the highest.  She reports sleeping, eating, and exercising for self-care; she noted no changes to her physical health this week.        DIMENSION 3:  Emotional/Behavioral/Cognitive Conditions and Complications  The degree to which any condition or complications are likely to interfere with treatment for substance abuse or with function in significant life areas and the likelihood of risk of harm to self or others.     Emotional/Behavioral - Current Risk Factor:  1    DSM-5 Diagnoses:   Client completed a Diagnostic Assessment with this  on 12/27/17.  No mental health diagnoses were evident.  Client reports no past or current mental health concerns.      Suicide Assessment:  Risk Status    Ideation - Active thoughts of suicide Intent to follow through on suicide Plan for completing suicide    Yes No Yes No Yes No   Emergent  X  X  X   Urgent / Non-Emergent  X  X  X   Non- Urgent  X  X  X   No Current/Active Risk   X  X  X     Goals: Client will gain insight regarding her interest in various career fields and gain awareness regarding her identity to focus on what in particular she wants to improve in herself.     Data:  Client denied any past or current SI and SIB.      5/29/18: Client reported feeling \"motivated and excited\" on this date.  She rated no " "symptoms higher than a 1, with 10 being the most severe.  As today was client's last day of Phase II, she reflected on her time in group and stated that she has learned to be more sensitive in how she communicates with others, which she has found to be helpful in group and in her personal life.      5/8/18: Client reported feeling \"anxious and excited\" on this date.  She rated no symptoms higher than a 1, with 10 being the most severe.  She stated that she is very glad to be living on her own now in an apartment and she is glad that she is a short commute time to group therapy.       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 Client has the following risk factors: legal issues, lack of social sober support, and she reported that she is socially influenced when it comes to using alcohol, and has used it in the past to enjoy herself.       DIMENSION 4:  Readiness to Change  Consider the amount of support and encouragement necessary to keep the client involved in treatment.     Readiness to Change - Current Risk Factor:  1    Goals:  Client will verbalize the benefits of this outpatient treatment program.               Client will gain insight regarding her motivation towards the changes she wants to achieve, and develop               realistic goals to achieve the financial changes she wants to make in her life.     Data:     5/29/18: Client rated her motivation for sobriety at a level 10, with 10 being the highest.  She noted that her main motivation for sobriety is \"seeing all the positive changes I've made\".  Client stated that while she has often not looked forward to coming to group, it served as important structure during her week and also allowed her to learn important tools.      5/8/18: Client rated her motivation for sobriety at a level 10, with 10 being the highest.  She noted that her main motivation for sobriety is \"my " "relationships\" and that \"nothing\" blocks her motivation for sobriety.  Client has now moved into an apartment on her own and she continues to report spending time with friends who are aware of and respect her sobriety.      *2/6/18: Client's risk rating has changed from a 0 to a 1.  Client was placed on a treatment contract on 2/6/18 due to receiving two unexcused absences over the past week.  While client did struggle in taking responsibility for missing group, she has been an active group participant during Phase I and admits to having a problem with alcohol use. Therefore, client will transition to Phase II beginning on 2/20/18.  During client's last Phase I group session on 2/6/18, she stated that spending time with friends and scheduling exercise time throughout the week will help her maintain consistent sober structure.      Client does report increased structure in her daily life by spending time with friends.       DIMENSION 5:  Relapse/Continued Use/Continued Problem Potential  Consider the degree to which the client recognizes relapse issues and has the skills to prevent relapse of either substance use or mental health problems.     Relapse/Continued Use/Continued Problem Potential - Current Risk Factor:  1    Goals:  Client will gain insight regarding relapse prevention skills and will demonstrate these skills and how she can apply them in social situations.    Data:    5/29/19: Client rated her craving level at a 2, with 10 being the highest.  She noted that \"staying away from my triggers (certain people and places)\" helps minimize the cravings and that her biggest trigger this past week was \"a wedding\".  She also noted that to expand her sober support network this past week, she \"had lunch with my boyfriend's mom\".  Client stated that a glass of champagne was provided at the table she sat at during the wedding; she said that she toasted with the champagne and set it aside.  Client verbalized frustration " "that drinking alcohol is an expectation of society.  Client shared her Recovery Care Plan in group on this date.        DIMENSION 6:  Recovery Environment  Consider the degree to which key areas of the client's life are supportive of or antagonistic to treatment participation and recovery.     Recovery Environment - Current Risk Factor:  1    Support group attended this week:  No    Did family agree to attend family week:  No    If yes:  none schedule this week    Goals:  Client will follow all directives from her , once she acquires a specific  and will expand her sober support network.     Data:      5/29/18: Client reported no sober support group meeting attendance this past week as \"I don't want to\" and she reported not having a sponsor.  She listed the following sober activities this past week: exercise and board games with family.  She noted that she would like to try \"paddle boarding\".  She rated her living situation at a 1, with 10 being the most stressful, as she noted \"I love living alone\".            D: Client has not been developing sober support with others who are in recovery.       Intervention: Client was asked if she would consider attending Recovery Yoga, offered through [facility].         Assessment:  Stages of Change- Action   Client stated that she would be willing to attend Recovery Yoga, and that it is something she can do to fill in extra free time now that she will no longer be attending group therapy.  Additionally, client agreed that this would be an important way to meet others in recovery.  While client was not open to attending AA/NA meetings, since client enjoys exercise, the format of yoga seems to be an avenue of interest for client.      Plan:               Continue to practice effective expression of emotions with family and friends             Continue to go to the gym and spend time with supportive friends              Explore " additional sober supports, specifically Recovery Yoga              Maintain daily structure

## 2018-05-29 NOTE — PROGRESS NOTES
[facility] Discharge Summary/Instructions     Patient: Nasra Ruiz  MRN: [id number]   : 1995 Age: 22 year old Sex: female   -  Focus of Treatment / Discharge Recommendations    Personal Safety/ Management of Symptoms    * Follow your safety plan.  Report increased symptoms to your care team and /or go to the nearest Emergency Department.    * Call crisis lines as needed    [facility] [phone]               [facility] [phone]  [facility] [phone]              [facility] [phone]  [facility] [phone]              [facility] [phone]  [facility] [phone]          National Suicide Prevention [phone]  [facility] [phone]            Suicide Prevention [phone]  [facility] [phone]    Abstinence/Relapse Prevention  * Take all medicines as directed.  Carry a current list of medicines with you.  * Use coping skills: mindful breathing, exercise (walking or going to the gym), or call one of my parents.    * Do not use illicit (street) drugs, controlled substances (narcotics) or alcohol.    Develop/Improve Independent Living/Socialization Skills: Client is open to exploring Recovery Yoga through the [place] [facility].      Community Resources/Supports and Discharge Planning:  Client would like to consider attending [employer] several times a week per month and increasing her connection to the community by volunteering through [employer].    Follow up with psychiatrist / main caregiver: N/A    Next visit: N/A    Follow up with your therapist: N/A   Next visit: N/A    Go to group therapy and / or support groups at: It is recommended that client attend meetings/support groups, as needed.      See your medical doctor about:  Annual physical exam and or any physical health concerns as needed/recommended    Other:  N/A    Client Signature:_______________________   Date / Time:___________  Staff  Signature:________________________   Date / Time:___________

## 2018-05-29 NOTE — PROGRESS NOTES
[address] [address]        TO WHOM IT MAY CONCERN:    RE: Aftercare        Nasra Ruiz completed primary treatment for chemical dependency at [facility].  Following this, the patient was referred to the Phase II Aftercare program.  It consists of 8 - 12 weekly support group sessions that are counselor led.    ______ Patient completed Phase II on 5/29/18, and attended 12 sessions.             If you have any further questions or concerns, please do not hesitate to contact me at [phone].        Sincerely,      Marely Wilkerson, [facility].

## 2018-05-30 NOTE — PROGRESS NOTES
CHEMICAL DEPENDENCY DISCHARGE SUMMARY   NAME: Nasra Ruiz  : 1995  EVALUATION COUNSELOR:  SONJA Hill    TREATMENT COUNSELOR: Marely Wilkerson MS, Formerly named [facility], [facility]    REFERRAL SOURCE:      PROGRAM:  [facility], Adult Intensive OP Co-Occurring Program    ADMISSION DATE:  17  LAST SESSION DATE:  18  DISCHARGE DATE: 18    ADMISSION DIAGNOSIS:    Alcohol Use Disorder, Mild (F10.10)    DISCHARGE DIAGNOSIS:    Alcohol Use Disorder, Mild, in early remission (F10.10)    DISCHARGE STATUS:   Completed Program    LAST USE DATE:  17    HOURS OF TREATMENT COMPLETED:  Client attended 22 sessions of Phase I, 12 sessions of Phase II, and 5 individual sessions for a total of 64 hours of treatment.    PRESENTING INFORMATION:  Client presented to treatment as a result of receiving her second DWI.     SERVICES PROVIDED:  Services included treatment and discharge planning, psycho education, recovery skills building, and individual and group therapy.  Client participated in a diagnostic session, an orientation session, a treatment planning session, group sessions where she was exposed to a variety of therapeutic techniques including behavior modification, cognitive behavioral therapy, motivational enhancement therapy, mindfulness, 12-step facilitation and group feedback.  She was also exposed to a variety of topics and assignments including but not limited to spirituality, communication, relationships, meditation, stress reduction, relapse prevention and sober support.        ISSUES ADDRESSED IN TREATMENT:    DIMENSION 1/ACUTE WITHDRAWAL ISSUES/DETOX:    Admit RR: 0   DC RR:  0    Client denied intoxication or withdrawal. Client reports her last use was 17.    DIMENSION 2/BIOMEDICAL:  Admit RR: 0   DC RR: 0   Client denies any medical issues.  It is recommended that client receive physical health services as needed/recommended.      DIMENSION 3/EMOTIONAL/BEHAVIORAL:   Admit  RR: 1   DC RR: 1  Client completed a Diagnostic Assessment on 12/27/17 and did not meet criteria for a mental health diagnosis.  Client had the following treatment plan goals: Client will gain insight regarding her interest in various career fields and gain awareness regarding her identity to focus on what in particular she wants to improve in herself.  Client worked on the following assignments: autobiography, career clusters, self-exploration, identity map, and strengths assignment.  Client reflected on her past use and her struggles in finding meaningful pursuits and hobbies in her life.   She decided to focus on creating meaningful relationships by setting lunch dates with friends, to engage in pursuits such as attending the gym several times throughout the week, and to consider a career change, such as by exploring the business field.  Through the self-exploration assignment, client also realized that she was not her authentic self while drinking and that she has been able to express her emotions more effectively to family and friends when sober.  Additionally, client realized that sometimes she can be harsh in her communication and approach with others and is practicing being more aware of her thoughts and being more sensitive towards others.  Client has the following protective factors: supportive family and boyfriend, employed, has her license in [place].  Client has the following risk factors: legal issues, lack of social sober support, and she reported that she is socially influenced when it comes to using alcohol, and has used it in the past to enjoy herself.  Client denies any suicidal thought, plan or intent.  Client was encouraged to report increased symptoms to their care team and /or go to the nearest Emergency Department and follow their safety plan if needed.    DIMENSION 4/READINESS TO CHANGE:  Admit RR: 0      DC RR: 0     Client is currently in the action stage of change.  Client had the  "following treatment plan goals: Client will verbalize the benefits of this outpatient treatment program; and client will gain insight regarding her motivation towards the changes she wants to achieve, and develop realistic goals to achieve the financial changes she wants to make in her life.   Client completed her \"Motivation and Change\" assignment and she verbalized that she was working on saving more money such as for vacations and other items that she might want to purchase.  Client admitted that increased structure is beneficial for her sobriety; she worked on implementing increased structure in her routine by going to the gym several times weekly, by setting lunch dates with friends, and by maintaining consistent employment.  Additionally, client admitted that while group therapy has been difficult for her and has felt \"negative\" for her at times, she benefitted from the structure and support of her peers.  She also admitted that she struggled, at times, to maintain a positive attitude towards attending group and that it is something she tried to improve by changing her thoughts.  Client stated that an important quote that she has applied to her recovery and will continue to apply moving forward is that \"nothing changes if nothing changes\".  Client participated and appeared to benefit from all programming.     DIMENSION 5/RELAPSE & CONTINUED PROBLEM POTENTIAL:  Admit RR: 2   DC RR:  1        Client has identified the following coping skills to help when she has experienced cravings: mindful breathing, exercise (walking or going to the gym), or call one of my parents.  Client stated that when experiencing stress at work, she will take time to herself to deep breathe, as needed.  Client had the following treatment plan goals: Client will gain insight regarding relapse prevention skills and will demonstrate these skills and how she can apply them in social situations. Client completed the \"social pressures\" " "assignment, the \"relapse warning signs\" assignment and the Recovery Care Plan.  Client stated that continued structure and routine will be very important to her recovery, such as through maintaining the same sleep schedule, and work schedule.  Client also stated that maintaining basic self-care in nutrition by cooking meals at home and keeping her environment clean will be helpful in her recovery.  Additionally, client stated that it will be important for her to stay away from triggers (certain people and places) to avoid relapse.  Client is making progress with utilizing her identified coping skills and is able to provide examples of when she was proactive in stressful situations to minimize the impact of the situation on her craving level.     DIMENSION 6/RECOVERY ENVIRONMENT: Admit RR: 2   DC RR: 1   Client has not attended support meetings while in treatment; she is open to attending Recovery Yoga.  This is recommended for her as a means of developing a sober support network with others who are in recovery.  Client was provided with contact information for exploring Recovery Yoga.  Client does not have a sponsor.  Client is currently living in an environment conducive to recovery; she lives on her own and reports that she is very satisfied living on her own.   Client is currently on probation for a DWI.  She reports the support of family, friends, and her boyfriend.  Additionally, client is employed and she is considering attending [employer] twice monthly and exploring volunteer options through a [employer].  Client attends the gym and reports engaging in consistent physical exercise.  Client had the following treatment plan goal: Client will follow all directives from her , once she acquires a specific  and will expand her sober support friend network.  Client shared about her probation requirements in group therapy and she expanded her sober support network by developing friendships " in situations not involving drinking.      STRENGTHS:  Client's strengths include daily structure, a consistent support network, and a desire to maintain increased meaning in her life which she has found in her recovery.       PROGNOSIS:   Favorable prognosis     LIVING ARRANGEMENTS AT DISCHARGE:   Client is living alone, independently.      CONTINUING CARE RECOMMENDATIONS/REFERRALS:  Client is recommended to attend Recovery Yoga weekly and to explore support meetings, as needed, to obtain and maintain open communication and regular contact with a support person regarding her recovery, to continue to develop and expand her support system, and to remain abstinent from all mood altering chemicals. Client is recommended to participate in individual therapy, as needed, and to maintain ongoing physical health care, as needed/recommended.

## 2018-05-31 NOTE — ADDENDUM NOTE
Encounter addended by: Marely Wilkerson LPCC on: 5/31/2018 12:24 PM<BR>     Actions taken: Pend clinical note

## 2018-05-31 NOTE — ADDENDUM NOTE
Encounter addended by: Marely Wilkerson [facility] on: 5/31/2018  1:41 PM<BR>     Actions taken: Episode edited, Episode resolved

## 2018-05-31 NOTE — ADDENDUM NOTE
Encounter addended by: Marely Wilkerson [facility] on: 5/31/2018  1:28 PM<BR>     Actions taken: Sign clinical note, Episode resolved

## 2023-11-22 NOTE — PROGRESS NOTES
**Client was absent from group therapy on 2/5/18 as she reported that she slept through her alarm and did not wake up on time. This is an unexcused absent.   CD ADULT Progress Note     Treatment Plan Review completed on: 2/8/2018    Client turned her safety plan in on 12/13/17 (later than expected) as she was unsure how to answer some of the questions.  Therefore, client's Acknowledgement of Current Treatment Plan was signed by client and counseling intern on 12/13/17.      Attendance Dates: 2/6/18, 2/7/18    Total # of Group Sessions:  Orientation on 11/22/17 + 21 sessions of Phase I     MONDAY TUESDAY WEDNESDAY THURSDAY FRIDAY SATURDAY SUNDAY Total   Group Therapy Unexcused Absence 2hrs  2hrs     4hrs   Specialty Groups*           1:1           Family Program           [place]             Phase II             Absent           Total Unexcused Absence 2hrs 2hrs     4hrs     *Specialty Groups include Mental Health Care, Assertiveness and Communication, Sobriety Maintenance Skills, Spiritual Care, Stress Management, Relapse Prevention, Family Systems.                    Learning Style:  Visual    Staff member contributing:  Marely Wilkerson, MS, LADC, LPCC and Ruperto Reeves, Intern    Received supervision:  No    Client:  contributed to goals and plan    Did Client receive a copy of treatment plan/revised plan:  Yes    Changes to Treatment Plan:      Client agrees with plan/revised plan:  Yes    1) Care Coordination Activities:  no  2) Medical, Mental Health and other appointments the client attended: none  3) Medication issues: no change  4) Physical and mental health problems: See dimension 2  5) Review and evaluation of the individual abuse prevention plan: NA    Any changes in Vulnerable Adult Status:  No    Substance Use Disorders:  None and Alcohol Use Disorder Mild (F10.10)      ASAM Risk Ratings and Data       DIMENSION 1: Acute Intoxication/Withdrawal  The client's ability to cope with withdrawal  "symptoms and current state of intoxication       Acute Intoxication/Withdrawal - Current Risk Factor:  0    Reporting sober date of 9/21/17    Goals:  Client will develop effective strategies to maintain abstinence.    Data:  Client did not demonstrate or report any signs/symptoms of intoxication or withdrawal.  Client confirmed sobriety date.      DIMENSION 2:  Biomedical Conditions and Complaints  The degree to which any physical disorder would interfere with treatment for substance abuse and the client's ability to tolerate any related discomfort     Biomedical Conditions and Complaints - Current Risk Factor:  0    Goals: Client will continue to effectively manage her overall physical health    Data: Client reported she was experiencing stomach pain and went to the ER on Tuesday 1/23/18. Client reported that she may have Chron's disease, however, she is waiting for an answer from her doctor regarding this. Client does report having a PCP.  Client rated her overall health at an 8, with 10 being the highest, on her check-in sheet from 2/6/18.  She continues to endorse \"sleeping\" as a self-care activity, and reported \"no\" regarding any changes to her physical health this week.       DIMENSION 3:  Emotional/Behavioral/Cognitive Conditions and Complications  The degree to which any condition or complications are likely to interfere with treatment for substance abuse or with function in significant life areas and the likelihood of risk of harm to self or others.     Emotional/Behavioral - Current Risk Factor:  1    DSM-5 Diagnoses:   Client completed a Diagnostic Assessment with this  on 12/27/17.  No mental health diagnoses were evident.  Client reports no past or current mental health concerns.      Suicide Assessment:  Risk Status    Ideation - Active thoughts of suicide Intent to follow through on suicide Plan for completing suicide    Yes No Yes No Yes No   Emergent  X  X  X   Urgent / Non-Emergent  X  X  " "X   Non- Urgent  X  X  X   No Current/Active Risk   X  X  X     Goals: Client will gain insight regarding her interest in various career fields and gain awareness regarding her identity to focus on what in particular she wants to improve in herself.     Data:  Client denied any past or current SI and SIB.      Client noted on her check-in sheet from 2/6/18 that she has been feeling \"tired and anxious\".  She denied experiencing any mental health symptoms, other than rating \"excessive sleep\" at a 4, with 10 being the highest. Client reported that the first 10 minutes of her day, she struggles getting up in the morning, and mentioned that she \"snoozes\" her alarm too much and this leads to her sleeping in at times.     Client has the following protective factors: supportive family and boyfriend, employed, [place] license [place]. Client has the following risk factors: legal issues, and reported that she is socially influenced when it comes to using alcohol, and has used it in the past to enjoy herself.       DIMENSION 4:  Readiness to Change  Consider the amount of support and encouragement necessary to keep the client involved in treatment.     Readiness to Change - Current Risk Factor:  1    Goals:  Client will verbalize the benefits of this outpatient treatment program.               Client will gain insight regarding her motivation towards the changes she wants to achieve, and develop               realistic goals to achieve the financial changes she wants to make in her life.     Data: Client reported on her check-in sheet from 2/6/18 that her main motivation for sobriety is \"to stay out of trouble\"; she rated her motivation level at an 8 (with 10 being the highest).  She continues to note that \"nothing\" currently blocks her motivation for sobriety.  Client reported that she is ready to be done with group therapy and her legal issues, and be off house arrest. It appears that client's motivation to complete " "treatment is her legal issues and lacks motivation towards personal development and insight regarding the choices she has made around using alcohol.     Client's risk rating has changed from a 0 to a 1.  Client was placed on a treatment contract on 2/6/18 due to receiving two unexcused absences over the past week.  While client did struggle in taking responsibility for missing group, she has been an active group participant during Phase I and admits to having a problem with alcohol use.  Therefore, client will transition to Phase II beginning on 2/2/18.  During client's last Phase I group session on 2/7/18, she stated that spending time with friends and scheduling exercise time throughout the week will help her maintain consistent sober structure.       DIMENSION 5:  Relapse/Continued Use/Continued Problem Potential  Consider the degree to which the client recognizes relapse issues and has the skills to prevent relapse of either substance use or mental health problems.     Relapse/Continued Use/Continued Problem Potential - Current Risk Factor:  2    Goals:  Client will gain insight regarding relapse prevention skills and will demonstrate these skills and how she can apply them in social situation.    Data:  Client rated her cravings over the last week at a level 1, with 10 being the highest.  She noted that \"not hanging out with old friends\" helps decrease her cravings.  She noted \"I didn't really have one\" regarding her biggest trigger this past week. Client reported that staying away from the small things that lead to drinking is helpful for her.     DIMENSION 6:  Recovery Environment  Consider the degree to which key areas of the client's life are supportive of or antagonistic to treatment participation and recovery.     Recovery Environment - Current Risk Factor:  2    Support group attended this week:  No    Did family agree to attend family week:  No    If yes:  none schedule this week    Goals:  Client will " "follow all directives from her , once she acquires a specific  and will expand her sober support network.     Data:  Client reported that she did not attend a sober support group meeting this past week due to being on house arrest. Client reported that she will be getting off house arrest this Friday, 2/9/18. Client reported that she spoke with a new sober friend from work this past week. Client rated her living situation a 2 out of 10, with 10 being the most stressful, on her check-in sheet from 2/6/18, as she noted that her roommate \"keeps having people over late.\" Client also reported that she was going to [place] this weekend to visit her boyfriend.        D: Role Play      Intervention: Client was directed to role play with another member in the group a scenario where they struggle with communication by using the DBT skill DEAR MAN.     Assessment:  Stages of Change Model  Contemplation   Client reported that while doing the role-play, she felt that it was hard to express her emotions towards the other person. Client was able to engage in the role play and discuss what went well and what she could work on with the group.  It appears that client may struggle to express her emotions.       Plan:  Client completed Phase I on 2/7/18. Client will begin attending Phase II group therapy on 2/20/18.             Client reported that she will try to continue to apply and use the skill DEAR MAN in her life.     " 1